# Patient Record
Sex: FEMALE | Race: WHITE | NOT HISPANIC OR LATINO | Employment: UNEMPLOYED | URBAN - METROPOLITAN AREA
[De-identification: names, ages, dates, MRNs, and addresses within clinical notes are randomized per-mention and may not be internally consistent; named-entity substitution may affect disease eponyms.]

---

## 2023-11-07 ENCOUNTER — TRANSFERRED RECORDS (OUTPATIENT)
Dept: HEALTH INFORMATION MANAGEMENT | Facility: CLINIC | Age: 53
End: 2023-11-07

## 2024-02-01 ENCOUNTER — HOSPITAL ENCOUNTER (INPATIENT)
Facility: CLINIC | Age: 54
LOS: 3 days | Discharge: HOME | DRG: 282 | End: 2024-02-04
Attending: INTERNAL MEDICINE | Admitting: HOSPITALIST
Payer: COMMERCIAL

## 2024-02-01 DIAGNOSIS — I21.4 NSTEMI (NON-ST ELEVATED MYOCARDIAL INFARCTION) (H): Primary | ICD-10-CM

## 2024-02-01 LAB
CREAT SERPL-MCNC: 0.76 MG/DL (ref 0.51–0.95)
EGFRCR SERPLBLD CKD-EPI 2021: >90 ML/MIN/1.73M2
ERYTHROCYTE [DISTWIDTH] IN BLOOD BY AUTOMATED COUNT: 15.8 % (ref 10–15)
HCT VFR BLD AUTO: 38.4 % (ref 35–47)
HGB BLD-MCNC: 12.5 G/DL (ref 11.7–15.7)
MAGNESIUM SERPL-MCNC: 1.7 MG/DL (ref 1.7–2.3)
MCH RBC QN AUTO: 29.1 PG (ref 26.5–33)
MCHC RBC AUTO-ENTMCNC: 32.6 G/DL (ref 31.5–36.5)
MCV RBC AUTO: 89 FL (ref 78–100)
PLATELET # BLD AUTO: 192 10E3/UL (ref 150–450)
POTASSIUM SERPL-SCNC: 3.6 MMOL/L (ref 3.4–5.3)
RBC # BLD AUTO: 4.3 10E6/UL (ref 3.8–5.2)
TROPONIN T SERPL HS-MCNC: 415 NG/L
UFH PPP CHRO-ACNC: 0.25 IU/ML
WBC # BLD AUTO: 6.3 10E3/UL (ref 4–11)

## 2024-02-01 PROCEDURE — 84484 ASSAY OF TROPONIN QUANT: CPT | Performed by: PHYSICIAN ASSISTANT

## 2024-02-01 PROCEDURE — 250N000013 HC RX MED GY IP 250 OP 250 PS 637: Performed by: PHYSICIAN ASSISTANT

## 2024-02-01 PROCEDURE — 84132 ASSAY OF SERUM POTASSIUM: CPT | Performed by: INTERNAL MEDICINE

## 2024-02-01 PROCEDURE — 36415 COLL VENOUS BLD VENIPUNCTURE: CPT | Performed by: PHYSICIAN ASSISTANT

## 2024-02-01 PROCEDURE — 85520 HEPARIN ASSAY: CPT | Performed by: INTERNAL MEDICINE

## 2024-02-01 PROCEDURE — 99222 1ST HOSP IP/OBS MODERATE 55: CPT | Mod: AI | Performed by: PHYSICIAN ASSISTANT

## 2024-02-01 PROCEDURE — 210N000002 HC R&B HEART CARE

## 2024-02-01 PROCEDURE — 82565 ASSAY OF CREATININE: CPT | Performed by: INTERNAL MEDICINE

## 2024-02-01 PROCEDURE — 85027 COMPLETE CBC AUTOMATED: CPT | Performed by: PHYSICIAN ASSISTANT

## 2024-02-01 PROCEDURE — 250N000011 HC RX IP 250 OP 636: Performed by: PHYSICIAN ASSISTANT

## 2024-02-01 PROCEDURE — 83735 ASSAY OF MAGNESIUM: CPT | Performed by: INTERNAL MEDICINE

## 2024-02-01 RX ORDER — HYDRALAZINE HYDROCHLORIDE 20 MG/ML
10 INJECTION INTRAMUSCULAR; INTRAVENOUS EVERY 4 HOURS PRN
Status: DISCONTINUED | OUTPATIENT
Start: 2024-02-01 | End: 2024-02-04 | Stop reason: HOSPADM

## 2024-02-01 RX ORDER — CALCIUM CARBONATE 500 MG/1
1000 TABLET, CHEWABLE ORAL 4 TIMES DAILY PRN
Status: DISCONTINUED | OUTPATIENT
Start: 2024-02-01 | End: 2024-02-04 | Stop reason: HOSPADM

## 2024-02-01 RX ORDER — HYDRALAZINE HYDROCHLORIDE 10 MG/1
10 TABLET, FILM COATED ORAL EVERY 4 HOURS PRN
Status: DISCONTINUED | OUTPATIENT
Start: 2024-02-01 | End: 2024-02-04 | Stop reason: HOSPADM

## 2024-02-01 RX ORDER — AMOXICILLIN 250 MG
2 CAPSULE ORAL 2 TIMES DAILY PRN
Status: DISCONTINUED | OUTPATIENT
Start: 2024-02-01 | End: 2024-02-04 | Stop reason: HOSPADM

## 2024-02-01 RX ORDER — LIDOCAINE 40 MG/G
CREAM TOPICAL
Status: DISCONTINUED | OUTPATIENT
Start: 2024-02-01 | End: 2024-02-04 | Stop reason: HOSPADM

## 2024-02-01 RX ORDER — METOPROLOL TARTRATE 25 MG/1
25 TABLET, FILM COATED ORAL 2 TIMES DAILY
Status: DISCONTINUED | OUTPATIENT
Start: 2024-02-01 | End: 2024-02-03

## 2024-02-01 RX ORDER — AMOXICILLIN 250 MG
1 CAPSULE ORAL 2 TIMES DAILY PRN
Status: DISCONTINUED | OUTPATIENT
Start: 2024-02-01 | End: 2024-02-04 | Stop reason: HOSPADM

## 2024-02-01 RX ORDER — ASPIRIN 81 MG/1
81 TABLET ORAL DAILY
Status: DISCONTINUED | OUTPATIENT
Start: 2024-02-02 | End: 2024-02-04 | Stop reason: HOSPADM

## 2024-02-01 RX ORDER — ACETAMINOPHEN 325 MG/1
650 TABLET ORAL EVERY 4 HOURS PRN
Status: DISCONTINUED | OUTPATIENT
Start: 2024-02-01 | End: 2024-02-04 | Stop reason: HOSPADM

## 2024-02-01 RX ORDER — HEPARIN SODIUM 10000 [USP'U]/100ML
0-5000 INJECTION, SOLUTION INTRAVENOUS CONTINUOUS
Status: DISCONTINUED | OUTPATIENT
Start: 2024-02-01 | End: 2024-02-02

## 2024-02-01 RX ORDER — NITROGLYCERIN 0.4 MG/1
0.4 TABLET SUBLINGUAL EVERY 5 MIN PRN
Status: DISCONTINUED | OUTPATIENT
Start: 2024-02-01 | End: 2024-02-04 | Stop reason: HOSPADM

## 2024-02-01 RX ORDER — ATORVASTATIN CALCIUM 80 MG/1
80 TABLET, FILM COATED ORAL EVERY EVENING
Status: DISCONTINUED | OUTPATIENT
Start: 2024-02-01 | End: 2024-02-02

## 2024-02-01 RX ORDER — ACETAMINOPHEN 650 MG/1
650 SUPPOSITORY RECTAL EVERY 4 HOURS PRN
Status: DISCONTINUED | OUTPATIENT
Start: 2024-02-01 | End: 2024-02-04 | Stop reason: HOSPADM

## 2024-02-01 RX ADMIN — HEPARIN SODIUM 1000 UNITS/HR: 10000 INJECTION, SOLUTION INTRAVENOUS at 21:43

## 2024-02-01 RX ADMIN — METOPROLOL TARTRATE 25 MG: 25 TABLET, FILM COATED ORAL at 21:39

## 2024-02-01 RX ADMIN — ATORVASTATIN CALCIUM 80 MG: 80 TABLET, FILM COATED ORAL at 21:38

## 2024-02-01 ASSESSMENT — ACTIVITIES OF DAILY LIVING (ADL)
ADLS_ACUITY_SCORE: 18
ADLS_ACUITY_SCORE: 18

## 2024-02-02 ENCOUNTER — APPOINTMENT (OUTPATIENT)
Dept: CARDIOLOGY | Facility: CLINIC | Age: 54
DRG: 282 | End: 2024-02-02
Attending: PHYSICIAN ASSISTANT
Payer: COMMERCIAL

## 2024-02-02 LAB
ALBUMIN SERPL BCG-MCNC: 3.8 G/DL (ref 3.5–5.2)
ALP SERPL-CCNC: 41 U/L (ref 40–150)
ALT SERPL W P-5'-P-CCNC: 13 U/L (ref 0–50)
ANION GAP SERPL CALCULATED.3IONS-SCNC: 7 MMOL/L (ref 7–15)
AST SERPL W P-5'-P-CCNC: 27 U/L (ref 0–45)
BASOPHILS # BLD AUTO: 0 10E3/UL (ref 0–0.2)
BASOPHILS NFR BLD AUTO: 1 %
BILIRUB SERPL-MCNC: 0.4 MG/DL
BUN SERPL-MCNC: 14.2 MG/DL (ref 6–20)
CALCIUM SERPL-MCNC: 9.3 MG/DL (ref 8.6–10)
CHLORIDE SERPL-SCNC: 105 MMOL/L (ref 98–107)
CHOLEST SERPL-MCNC: 163 MG/DL
CHOLEST SERPL-MCNC: 168 MG/DL
CREAT SERPL-MCNC: 0.73 MG/DL (ref 0.51–0.95)
D DIMER PPP FEU-MCNC: 0.27 UG/ML FEU (ref 0–0.5)
DEPRECATED HCO3 PLAS-SCNC: 26 MMOL/L (ref 22–29)
EGFRCR SERPLBLD CKD-EPI 2021: >90 ML/MIN/1.73M2
EOSINOPHIL # BLD AUTO: 0.3 10E3/UL (ref 0–0.7)
EOSINOPHIL NFR BLD AUTO: 5 %
ERYTHROCYTE [DISTWIDTH] IN BLOOD BY AUTOMATED COUNT: 15.8 % (ref 10–15)
GLUCOSE SERPL-MCNC: 91 MG/DL (ref 70–99)
HBA1C MFR BLD: 5.3 %
HCG INTACT+B SERPL-ACNC: 1 MIU/ML
HCT VFR BLD AUTO: 40.3 % (ref 35–47)
HDLC SERPL-MCNC: 71 MG/DL
HDLC SERPL-MCNC: 72 MG/DL
HGB BLD-MCNC: 13.1 G/DL (ref 11.7–15.7)
IMM GRANULOCYTES # BLD: 0 10E3/UL
IMM GRANULOCYTES NFR BLD: 0 %
INR PPP: 1.06 (ref 0.85–1.15)
LDLC SERPL CALC-MCNC: 78 MG/DL
LDLC SERPL CALC-MCNC: 81 MG/DL
LVEF ECHO: NORMAL
LYMPHOCYTES # BLD AUTO: 1.5 10E3/UL (ref 0.8–5.3)
LYMPHOCYTES NFR BLD AUTO: 26 %
MAGNESIUM SERPL-MCNC: 1.8 MG/DL (ref 1.7–2.3)
MCH RBC QN AUTO: 28.9 PG (ref 26.5–33)
MCHC RBC AUTO-ENTMCNC: 32.5 G/DL (ref 31.5–36.5)
MCV RBC AUTO: 89 FL (ref 78–100)
MONOCYTES # BLD AUTO: 0.5 10E3/UL (ref 0–1.3)
MONOCYTES NFR BLD AUTO: 10 %
NEUTROPHILS # BLD AUTO: 3.3 10E3/UL (ref 1.6–8.3)
NEUTROPHILS NFR BLD AUTO: 58 %
NONHDLC SERPL-MCNC: 91 MG/DL
NONHDLC SERPL-MCNC: 97 MG/DL
NRBC # BLD AUTO: 0 10E3/UL
NRBC BLD AUTO-RTO: 0 /100
PLATELET # BLD AUTO: 192 10E3/UL (ref 150–450)
POTASSIUM SERPL-SCNC: 4.2 MMOL/L (ref 3.4–5.3)
PROT SERPL-MCNC: 6.1 G/DL (ref 6.4–8.3)
RBC # BLD AUTO: 4.53 10E6/UL (ref 3.8–5.2)
SODIUM SERPL-SCNC: 138 MMOL/L (ref 135–145)
TRIGL SERPL-MCNC: 65 MG/DL
TRIGL SERPL-MCNC: 79 MG/DL
TROPONIN T SERPL HS-MCNC: 326 NG/L
UFH PPP CHRO-ACNC: 0.21 IU/ML
UFH PPP CHRO-ACNC: 0.43 IU/ML
WBC # BLD AUTO: 5.7 10E3/UL (ref 4–11)

## 2024-02-02 PROCEDURE — 250N000011 HC RX IP 250 OP 636: Performed by: PHYSICIAN ASSISTANT

## 2024-02-02 PROCEDURE — 99223 1ST HOSP IP/OBS HIGH 75: CPT | Mod: 25 | Performed by: INTERNAL MEDICINE

## 2024-02-02 PROCEDURE — 80061 LIPID PANEL: CPT | Performed by: PHYSICIAN ASSISTANT

## 2024-02-02 PROCEDURE — 4A023N7 MEASUREMENT OF CARDIAC SAMPLING AND PRESSURE, LEFT HEART, PERCUTANEOUS APPROACH: ICD-10-PCS | Performed by: INTERNAL MEDICINE

## 2024-02-02 PROCEDURE — 250N000013 HC RX MED GY IP 250 OP 250 PS 637: Performed by: PHYSICIAN ASSISTANT

## 2024-02-02 PROCEDURE — 93306 TTE W/DOPPLER COMPLETE: CPT | Mod: 26 | Performed by: INTERNAL MEDICINE

## 2024-02-02 PROCEDURE — 93454 CORONARY ARTERY ANGIO S&I: CPT | Performed by: INTERNAL MEDICINE

## 2024-02-02 PROCEDURE — 36415 COLL VENOUS BLD VENIPUNCTURE: CPT | Performed by: INTERNAL MEDICINE

## 2024-02-02 PROCEDURE — 250N000011 HC RX IP 250 OP 636: Performed by: INTERNAL MEDICINE

## 2024-02-02 PROCEDURE — B2111ZZ FLUOROSCOPY OF MULTIPLE CORONARY ARTERIES USING LOW OSMOLAR CONTRAST: ICD-10-PCS | Performed by: INTERNAL MEDICINE

## 2024-02-02 PROCEDURE — 250N000013 HC RX MED GY IP 250 OP 250 PS 637: Performed by: INTERNAL MEDICINE

## 2024-02-02 PROCEDURE — 93005 ELECTROCARDIOGRAM TRACING: CPT

## 2024-02-02 PROCEDURE — C1769 GUIDE WIRE: HCPCS | Performed by: INTERNAL MEDICINE

## 2024-02-02 PROCEDURE — 99232 SBSQ HOSP IP/OBS MODERATE 35: CPT | Performed by: HOSPITALIST

## 2024-02-02 PROCEDURE — 83036 HEMOGLOBIN GLYCOSYLATED A1C: CPT | Performed by: PHYSICIAN ASSISTANT

## 2024-02-02 PROCEDURE — 258N000003 HC RX IP 258 OP 636: Performed by: HOSPITALIST

## 2024-02-02 PROCEDURE — 255N000002 HC RX 255 OP 636: Performed by: INTERNAL MEDICINE

## 2024-02-02 PROCEDURE — C8929 TTE W OR WO FOL WCON,DOPPLER: HCPCS

## 2024-02-02 PROCEDURE — 999N000208 ECHOCARDIOGRAM COMPLETE

## 2024-02-02 PROCEDURE — 99153 MOD SED SAME PHYS/QHP EA: CPT | Performed by: INTERNAL MEDICINE

## 2024-02-02 PROCEDURE — 85520 HEPARIN ASSAY: CPT | Performed by: INTERNAL MEDICINE

## 2024-02-02 PROCEDURE — 258N000003 HC RX IP 258 OP 636: Performed by: INTERNAL MEDICINE

## 2024-02-02 PROCEDURE — 83735 ASSAY OF MAGNESIUM: CPT | Performed by: INTERNAL MEDICINE

## 2024-02-02 PROCEDURE — C1894 INTRO/SHEATH, NON-LASER: HCPCS | Performed by: INTERNAL MEDICINE

## 2024-02-02 PROCEDURE — 84484 ASSAY OF TROPONIN QUANT: CPT | Performed by: PHYSICIAN ASSISTANT

## 2024-02-02 PROCEDURE — 36415 COLL VENOUS BLD VENIPUNCTURE: CPT | Performed by: HOSPITALIST

## 2024-02-02 PROCEDURE — 85379 FIBRIN DEGRADATION QUANT: CPT | Performed by: HOSPITALIST

## 2024-02-02 PROCEDURE — 84702 CHORIONIC GONADOTROPIN TEST: CPT | Performed by: INTERNAL MEDICINE

## 2024-02-02 PROCEDURE — 272N000001 HC OR GENERAL SUPPLY STERILE: Performed by: INTERNAL MEDICINE

## 2024-02-02 PROCEDURE — 36415 COLL VENOUS BLD VENIPUNCTURE: CPT | Performed by: PHYSICIAN ASSISTANT

## 2024-02-02 PROCEDURE — 99152 MOD SED SAME PHYS/QHP 5/>YRS: CPT | Performed by: INTERNAL MEDICINE

## 2024-02-02 PROCEDURE — 93454 CORONARY ARTERY ANGIO S&I: CPT | Mod: 26 | Performed by: INTERNAL MEDICINE

## 2024-02-02 PROCEDURE — 250N000009 HC RX 250: Performed by: INTERNAL MEDICINE

## 2024-02-02 PROCEDURE — 85610 PROTHROMBIN TIME: CPT | Performed by: INTERNAL MEDICINE

## 2024-02-02 PROCEDURE — 210N000002 HC R&B HEART CARE

## 2024-02-02 PROCEDURE — 80053 COMPREHEN METABOLIC PANEL: CPT | Performed by: PHYSICIAN ASSISTANT

## 2024-02-02 PROCEDURE — 85025 COMPLETE CBC W/AUTO DIFF WBC: CPT | Performed by: PHYSICIAN ASSISTANT

## 2024-02-02 RX ORDER — ASPIRIN 81 MG/1
243 TABLET, CHEWABLE ORAL ONCE
Status: COMPLETED | OUTPATIENT
Start: 2024-02-02 | End: 2024-02-02

## 2024-02-02 RX ORDER — FENTANYL CITRATE 50 UG/ML
INJECTION, SOLUTION INTRAMUSCULAR; INTRAVENOUS
Status: DISCONTINUED | OUTPATIENT
Start: 2024-02-02 | End: 2024-02-02 | Stop reason: HOSPADM

## 2024-02-02 RX ORDER — ACETAMINOPHEN 325 MG/1
650 TABLET ORAL EVERY 4 HOURS PRN
Status: DISCONTINUED | OUTPATIENT
Start: 2024-02-02 | End: 2024-02-02

## 2024-02-02 RX ORDER — FLUMAZENIL 0.1 MG/ML
0.2 INJECTION, SOLUTION INTRAVENOUS
Status: ACTIVE | OUTPATIENT
Start: 2024-02-02 | End: 2024-02-02

## 2024-02-02 RX ORDER — SODIUM CHLORIDE 9 MG/ML
INJECTION, SOLUTION INTRAVENOUS CONTINUOUS
Status: DISCONTINUED | OUTPATIENT
Start: 2024-02-02 | End: 2024-02-02 | Stop reason: HOSPADM

## 2024-02-02 RX ORDER — NALOXONE HYDROCHLORIDE 0.4 MG/ML
0.4 INJECTION, SOLUTION INTRAMUSCULAR; INTRAVENOUS; SUBCUTANEOUS
Status: DISCONTINUED | OUTPATIENT
Start: 2024-02-02 | End: 2024-02-04 | Stop reason: HOSPADM

## 2024-02-02 RX ORDER — FENTANYL CITRATE 50 UG/ML
25 INJECTION, SOLUTION INTRAMUSCULAR; INTRAVENOUS
Status: DISCONTINUED | OUTPATIENT
Start: 2024-02-02 | End: 2024-02-04 | Stop reason: HOSPADM

## 2024-02-02 RX ORDER — MAGNESIUM OXIDE 400 MG/1
400 TABLET ORAL EVERY 4 HOURS
Status: COMPLETED | OUTPATIENT
Start: 2024-02-02 | End: 2024-02-02

## 2024-02-02 RX ORDER — IOPAMIDOL 755 MG/ML
INJECTION, SOLUTION INTRAVASCULAR
Status: DISCONTINUED | OUTPATIENT
Start: 2024-02-02 | End: 2024-02-02 | Stop reason: HOSPADM

## 2024-02-02 RX ORDER — SODIUM CHLORIDE 9 MG/ML
INJECTION, SOLUTION INTRAVENOUS CONTINUOUS
Status: DISCONTINUED | OUTPATIENT
Start: 2024-02-02 | End: 2024-02-03

## 2024-02-02 RX ORDER — POTASSIUM CHLORIDE 1500 MG/1
20 TABLET, EXTENDED RELEASE ORAL
Status: DISCONTINUED | OUTPATIENT
Start: 2024-02-02 | End: 2024-02-02 | Stop reason: HOSPADM

## 2024-02-02 RX ORDER — SODIUM CHLORIDE 9 MG/ML
75 INJECTION, SOLUTION INTRAVENOUS CONTINUOUS
Status: ACTIVE | OUTPATIENT
Start: 2024-02-02 | End: 2024-02-02

## 2024-02-02 RX ORDER — LIDOCAINE 40 MG/G
CREAM TOPICAL
Status: DISCONTINUED | OUTPATIENT
Start: 2024-02-02 | End: 2024-02-02 | Stop reason: HOSPADM

## 2024-02-02 RX ORDER — ATROPINE SULFATE 0.1 MG/ML
0.5 INJECTION INTRAVENOUS
Status: ACTIVE | OUTPATIENT
Start: 2024-02-02 | End: 2024-02-02

## 2024-02-02 RX ORDER — LORAZEPAM 2 MG/ML
0.5 INJECTION INTRAMUSCULAR
Status: DISCONTINUED | OUTPATIENT
Start: 2024-02-02 | End: 2024-02-02 | Stop reason: HOSPADM

## 2024-02-02 RX ORDER — LORAZEPAM 0.5 MG/1
0.5 TABLET ORAL
Status: DISCONTINUED | OUTPATIENT
Start: 2024-02-02 | End: 2024-02-02 | Stop reason: HOSPADM

## 2024-02-02 RX ORDER — NALOXONE HYDROCHLORIDE 0.4 MG/ML
0.2 INJECTION, SOLUTION INTRAMUSCULAR; INTRAVENOUS; SUBCUTANEOUS
Status: DISCONTINUED | OUTPATIENT
Start: 2024-02-02 | End: 2024-02-04 | Stop reason: HOSPADM

## 2024-02-02 RX ORDER — HEPARIN SODIUM 1000 [USP'U]/ML
INJECTION, SOLUTION INTRAVENOUS; SUBCUTANEOUS
Status: DISCONTINUED | OUTPATIENT
Start: 2024-02-02 | End: 2024-02-02 | Stop reason: HOSPADM

## 2024-02-02 RX ORDER — ASPIRIN 325 MG
325 TABLET ORAL ONCE
Status: COMPLETED | OUTPATIENT
Start: 2024-02-02 | End: 2024-02-02

## 2024-02-02 RX ORDER — POTASSIUM CHLORIDE 1500 MG/1
20 TABLET, EXTENDED RELEASE ORAL ONCE
Status: COMPLETED | OUTPATIENT
Start: 2024-02-02 | End: 2024-02-02

## 2024-02-02 RX ORDER — NITROGLYCERIN 5 MG/ML
VIAL (ML) INTRAVENOUS
Status: DISCONTINUED | OUTPATIENT
Start: 2024-02-02 | End: 2024-02-02 | Stop reason: HOSPADM

## 2024-02-02 RX ORDER — VERAPAMIL HYDROCHLORIDE 2.5 MG/ML
INJECTION, SOLUTION INTRAVENOUS
Status: DISCONTINUED | OUTPATIENT
Start: 2024-02-02 | End: 2024-02-02 | Stop reason: HOSPADM

## 2024-02-02 RX ORDER — ONDANSETRON 2 MG/ML
INJECTION INTRAMUSCULAR; INTRAVENOUS
Status: DISCONTINUED | OUTPATIENT
Start: 2024-02-02 | End: 2024-02-02 | Stop reason: HOSPADM

## 2024-02-02 RX ADMIN — Medication 400 MG: at 05:02

## 2024-02-02 RX ADMIN — ASPIRIN 81 MG: 81 TABLET, COATED ORAL at 09:25

## 2024-02-02 RX ADMIN — SODIUM CHLORIDE: 9 INJECTION, SOLUTION INTRAVENOUS at 19:03

## 2024-02-02 RX ADMIN — SODIUM CHLORIDE 150 ML/HR: 9 INJECTION, SOLUTION INTRAVENOUS at 10:54

## 2024-02-02 RX ADMIN — ACETAMINOPHEN 650 MG: 325 TABLET, FILM COATED ORAL at 06:28

## 2024-02-02 RX ADMIN — Medication 400 MG: at 00:10

## 2024-02-02 RX ADMIN — METOPROLOL TARTRATE 25 MG: 25 TABLET, FILM COATED ORAL at 09:25

## 2024-02-02 RX ADMIN — POTASSIUM CHLORIDE 20 MEQ: 1500 TABLET, EXTENDED RELEASE ORAL at 00:10

## 2024-02-02 RX ADMIN — HEPARIN SODIUM 1150 UNITS/HR: 10000 INJECTION, SOLUTION INTRAVENOUS at 06:26

## 2024-02-02 RX ADMIN — HUMAN ALBUMIN MICROSPHERES AND PERFLUTREN 9 ML: 10; .22 INJECTION, SOLUTION INTRAVENOUS at 10:19

## 2024-02-02 RX ADMIN — ASPIRIN 81 MG CHEWABLE TABLET 243 MG: 81 TABLET CHEWABLE at 10:54

## 2024-02-02 ASSESSMENT — ACTIVITIES OF DAILY LIVING (ADL)
ADLS_ACUITY_SCORE: 18

## 2024-02-02 NOTE — PROGRESS NOTES
"[facility]    Medicine Progress Note - Hospitalist Service    Date of Admission:  2/1/2024    Assessment & Plan   Chest pain with troponin elevation  P/w chest pain, dizziness, nausea, emesis  to an outside hospital with reported negative EKG found to have troponin elevation consistent with NSTEMI and was transferred to this facility.  She has remained cp free  Echocardiogram with some aortic root dilation but no wall motion abnormalities  Cardiology consulted and underwent an angiogram on 2/2 without significant coronary artery disease except for a segment of intramyocardial bridging with mild systolic compression in the midLAD (this would not be expected to cause angina or NSTEMI).   plan  - ASA 81 mg daily  - no indication for continuation of statin  - we will obtain a d dimer, and if positive a CT PE study, but her pain was not pleuritic and she does not endorse symptoms of DVT  - remain on telemetry  - continue monitoring tonight very closely      Neck pain, chronic and likely related to musculoskeletal issues  She also reports occ nocturnal hand numbness that resolves upon waking bilaterally, and she may benefit from cervical imaging, discussed with her to discuss with her PCP        Diet: Advance Diet as Tolerated: Clear Liquid Diet    DVT Prophylaxis: Pneumatic Compression Devices  Covarrubias Catheter: Not present  Lines: PRESENT             Cardiac Monitoring: ACTIVE order. Indication: Post- PCI/Angiogram (24 hours)  Code Status: Full Code      Clinically Significant Risk Factors Present on Admission                       # Obesity: Estimated body mass index is 32.27 kg/m  as calculated from the following:    Height as of this encounter: 1.651 m (5' 5\").    Weight as of this encounter: 88 kg (193 lb 14.4 oz).              Disposition Plan      Expected Discharge Date: 02/03/2024                    Dinh Naylor, DO  Hospitalist Service  [facility] " Hospital  Securely message with e27 (more info)  Text page via [facility] Paging/Directory   ______________________________________________________________________    Interval History   No major chest pain  Endorses signfiicant pain in the neck for many years, worse with mobility of the neck. Some numbness and tingling in the bilateral hands    Physical Exam   Vital Signs: Temp: 98  F (36.7  C) Temp src: Oral BP: (!) 147/84 Pulse: 53   Resp: 16 SpO2: 96 % O2 Device: None (Room air)    Weight: 193 lbs 14.4 oz    Constitutional: Awake, alert, cooperative, no apparent distress.    ENT: Normocephalic, without obvious abnormality, atraumatic. Eye pupils are equal. Normal sclera.    Neck: Supple, symmetrical, trachea midline  Pulmonary: No increased work of breathing, good air exchange, clear to auscultation bilaterally  Cardiovascular: Regular rate and rhythm, normal S1 and S2, no S3 or S4, and no murmur noted.  GI: Normal bowel sounds, soft, non-distended, non-tender.   Skin: Visualized skin appeared clear.  Neuro: Oriented x 3. Moves all extremities spontaneously. No focal neuro deficits.  Psych:  Normal affect and mood  Extremities: Normal muscle tone. No gross deformities noted. Calves non-tender b/l. No pitting edema b/l LE    Medical Decision Making       60 MINUTES SPENT BY ME on the date of service doing chart review, history, exam, documentation & further activities per the note.      Data     I have personally reviewed the following data over the past 24 hrs:    5.7  \   13.1   / 192     138 105 14.2 /  91   4.2 26 0.73 \     ALT: 13 AST: 27 AP: 41 TBILI: 0.4   ALB: 3.8 TOT PROTEIN: 6.1 (L) LIPASE: N/A     Trop: 326 (HH) BNP: N/A     TSH: N/A T4: N/A A1C: 5.3     INR:  1.06 PTT:  N/A   D-dimer:  N/A Fibrinogen:  N/A       Imaging results reviewed over the past 24 hrs:   Recent Results (from the past 24 hour(s))   XR CHEST 2 VIEWS    Narrative    PROCEDURE: XR CHEST 2 VIEWS.    HISTORY: chest pain    COMPARISON:  2017.    FINDINGS:  No focal pulmonary opacity, pneumothorax, or pleural effusion. Normal cardiac  and mediastinal contours.     IMPRESSION:  No acute cardiopulmonary findings.     Electronically signed by Mane Denney MD   Report Date: 2024 7:36 AM   Echocardiogram Complete   Result Value    LVEF  55-60%    Narrative    [id number]  [id number]  [id number]  [id number]^ALBERT^HALIMA^MEMO     [facility]  Echocardiography Laboratory  [address] [id number]     Name: SARA CABARL  MRN: [id number]  : 1970  Study Date: 2024 09:54 AM  Age: 53 yrs  Gender: Female  Patient Location: [facility] [address]  Reason For Study: Chest Pain  Ordering Physician: HALIMA PRICE  Referring Physician: SYSTEM, PROVIDER NOT IN  Performed By: Melania Spicer     BSA: 2.0 m2  Height: 65 in  Weight: 196 lb  HR: 64  BP: 154/84 mmHg  ______________________________________________________________________________  Procedure  Complete Echo Adult. Optison (NDC #[id number]) given intravenously.  ______________________________________________________________________________  Interpretation Summary     The visual ejection fraction is 55-60%.  No regional wall motion abnormalities noted.  Normal tricuspid aortic valve  There is trace aortic regurgitation.  Aortic root dilatation is present.  Ascending aorta dilatation is present.  ______________________________________________________________________________  Left Ventricle  The left ventricle is normal in size. There is normal left ventricular wall  thickness. Diastolic Doppler findings (E/E' ratio and/or other parameters)  suggest left ventricular filling pressures are indeterminate. The visual  ejection fraction is 55-60%. No regional wall motion abnormalities noted.     Right Ventricle  The right ventricle is normal in size and function.     Atria  Normal left atrial size. Right atrial size is normal. There is no color  Doppler evidence of an atrial  shunt.     Mitral Valve  The mitral valve leaflets are mildly thickened. There is trace mitral  regurgitation.     Tricuspid Valve  There is trace tricuspid regurgitation. IVC diameter and respiratory changes  fall into an intermediate range suggesting an RA pressure of 8 mmHg.     Aortic Valve  Normal tricuspid aortic valve. There is trace aortic regurgitation.     Pulmonic Valve  There is trace pulmonic valvular regurgitation.     Vessels  Aortic root dilatation is present. Ascending aorta dilatation is present.     Pericardium  There is no pericardial effusion.     Rhythm  Sinus rhythm was noted.  ______________________________________________________________________________  MMode/2D Measurements & Calculations  IVSd: 0.99 cm     LVIDd: 3.9 cm  LVPWd: 0.98 cm  LV mass(C)d: 117.2 grams  LV mass(C)dI: 59.8 grams/m2  Ao root diam: 3.8 cm  LA dimension: 2.2 cm  asc Aorta Diam: 4.0 cm  LA/Ao: 0.58  LVOT diam: 2.1 cm  LVOT area: 3.4 cm2  Ao root diam index Ht(cm/m): 2.3  Ao root diam index BSA (cm/m2): 1.9  Asc Ao diam index BSA (cm/m2): 2.1  Asc Ao diam index Ht(cm/m): 2.4  LA Volume (BP): 27.9 ml     LA Volume Index (BP): 14.2 ml/m2  RV Base: 2.7 cm  RWT: 0.51  TAPSE: 2.4 cm     Doppler Measurements & Calculations  MV E max ja: 62.6 cm/sec  MV A max ja: 102.4 cm/sec  MV E/A: 0.61  MV dec slope: 269.2 cm/sec2  MV dec time: 0.23 sec  Ao V2 max: 139.5 cm/sec  Ao max P.0 mmHg  Ao V2 mean: 92.1 cm/sec  Ao mean P.0 mmHg  Ao V2 VTI: 30.0 cm  TRISHA(I,D): 2.4 cm2  TRISHA(V,D): 2.1 cm2  LV V1 max PG: 3.0 mmHg  LV V1 max: 86.5 cm/sec  LV V1 VTI: 21.4 cm  SV(LVOT): 72.8 ml  SI(LVOT): 37.1 ml/m2  PA acc time: 0.11 sec  AV Ja Ratio (DI): 0.62  TRISHA Index (cm2/m2): 1.2  E/E' av.4  Lateral E/e': 6.1     Medial E/e': 8.6  RV S Ja: 11.6 cm/sec     ______________________________________________________________________________  Report approved by: Dangelo Young 2024 01:24 PM         Cardiac Catheterization     Narrative    Coronary arteries appear angiographically normal except for a segment of   intramyocardial bridging with mild systolic compression in the midLAD   (this would not be expected to cause angina or NSTEMI).

## 2024-02-02 NOTE — PLAN OF CARE
Goal Outcome Evaluation:    VSS. Monitor remains Sinus rhythm. Pt. Is alert and oriented. Pt. Has been painfree. Heparin drip at 1000 unit(s)/hr. Continue to monitor. NPO after midnight. Plan for Cardiology consult and Echo tomorrow.

## 2024-02-02 NOTE — PROVIDER NOTIFICATION
Notification     Notified Person: Alexandrea GOMEZ    Notification Time: 2325     Notification Interaction: Volcerfelicia     Purpose of Notification:   admitted this evening w/ nstemi. Received critical troponin from lab 415, on heparin and pain free. Q4 troponins ordered.     Orders Received: Continue w/ poc

## 2024-02-02 NOTE — PLAN OF CARE
Goal Outcome Evaluation:      Plan of Care Reviewed With: patient      Patient is alert oriented independent in the room, denies chest pain, VSS, RA, on heparin drip at cath lab with for angiogram. Family is at bedside.

## 2024-02-02 NOTE — H&P
"[facility]  History and Physical - Hospitalist Service       Date of Admission:  2/1/2024  PRIMARY CARE PROVIDER:    No primary care provider on file.    Assessment & Plan   Lorena Tolbert is a 53 year old female with PMH of anxiety, iron deficiency anemia who presented to [facility] ED on 2/1/24 for evaluation of chest pain, dizziness, nausea, emesis x 1. She woke up with the chest pain, which radiated into her left arm and was described as a tightness. First episode lasted about 2 hours before resolving, and then she had another episode lasting about an hour or so prior to arriving to the ED. She has been chest pain free since. CBC and BMP unremarkable. High sensitivity troponin I was elevated at 4,034. Per report, CXR negative. Initial EKG NSR without acute ischemic changes, with repeat unchanged. She was given ASA and started on a Heparin drip for NSTEMI. Case was discussed with cardiology prior to transfer to [facility], no plan for emergent angiogram. Patient to be made NPO pending cardiology evaluation tomorrow.    Upon arrival to [facility], /84 and HR 82. She endorses no chest pain, dyspnea, nausea, vomiting. She has no cardiac history. Reports mother has hx CHF, but does not have any family with hx MI or CAD. She is a non smoker. No significant PMH (no HTN, HLD, DM II).    NSTEMI  - ASA 81 mg daily  - Continue Heparin drip   - Start Metoprolol Tartrate 25 mg BID  - Start Atorvastatin 80 mg HS  - Trend troponin to peak  - Check lipid panel tomorrow AM  - Check A1c tomorrow AM  - ECHO ordered  - Cardiology consult requested  - NPO after midnight    Hx iron deficiency anemia  *Hgb WNL at 13.9    Clinically Significant Risk Factors Present on Admission                       # Obesity: Estimated body mass index is 32.7 kg/m  as calculated from the following:    Height as of this encounter: 1.651 m (5' 5\").    Weight as of this encounter: 89.1 kg (196 lb 8 oz).               Diet: " Combination Diet Low Saturated Fat Na <2400mg Diet, No Caffeine Diet  NPO for Medical/Clinical Reasons Except for: Ice Chips, Meds    DVT Prophylaxis: Heparin gtt  Covarrubias Catheter: Not present  Lines: None     Cardiac Monitoring: ACTIVE order. Indication: AMI (NSTEMI/ STEMI) (48 hours)  Code Status:  Full         Disposition Plan      Expected Discharge Date: 02/03/2024             Entered: Meliza Neely PA-C 02/01/2024, 8:16 PM     The patient's care was discussed with the Attending Physician, Dr. Lechuga and Patient.    Meliza Neely PA-C  [facility]  Securely message with the Vocera Web Console (learn more here)      ______________________________________________________________________    Chief Complaint   Chest pain    History is obtained from the patient and EMR.      History of Present Illness   Lorena Tolbert is a 53 year old female with PMH of anxiety, iron deficiency anemia who presented to [facility] ED on 2/1/24 for evaluation of chest pain, dizziness, nausea, emesis x 1. She woke up with the chest pain, which radiated into her left arm and was described as a tightness. First episode lasted about 2 hours before resolving, and then she had another episode lasting about an hour or so prior to arriving to the ED. She has been chest pain free since. CBC and BMP unremarkable. High sensitivity troponin I was elevated at 4,034. Per report, CXR negative. Initial EKG NSR without acute ischemic changes, with repeat unchanged. She was given ASA and started on a Heparin drip for NSTEMI. Patient was transferred to [facility] for cardiology consultation.    Upon arrival to [facility], /84 and HR 82. She endorses no chest pain, dyspnea, nausea, vomiting. She has no cardiac history. Reports mother has hx CHF, but does not have any family with hx MI or CAD. She is a non smoker. No significant PMH (no HTN, HLD, DM II).    Past Medical History    I have reviewed this patient's medical  history and updated it with pertinent information if needed.   No past medical history on file.    Prior to Admission Medications   Prior to Admission Medications   Prescriptions Last Dose Informant Patient Reported? Taking?   Cyanocobalamin (VITAMIN B12 PO)   Yes Yes   Sig: Take 1 tablet by mouth daily   Ferrous Sulfate (IRON PO) 2/1/2024  Yes Yes   Sig: Take 1 tablet by mouth daily   VITAMIN D PO   Yes Yes   Sig: Take 1 tablet by mouth daily      Facility-Administered Medications: None     Allergies   No Known Allergies    Physical Exam   Vital Signs: Temp: 98.2  F (36.8  C) Temp src: Tympanic BP: (!) 154/84 Pulse: 82   Resp: 18 SpO2: 99 % O2 Device: None (Room air)    Weight: 196 lbs 8 oz    Constitutional: Awake, alert, cooperative, no apparent distress.    ENT: Normocephalic, without obvious abnormality, atraumatic. Eye pupils are equal. Normal sclera.    Neck: Supple, symmetrical, trachea midline  Pulmonary: No increased work of breathing, good air exchange, clear to auscultation bilaterally  Cardiovascular: Regular rate and rhythm, normal S1 and S2, no S3 or S4, and no murmur noted.  GI: Normal bowel sounds, soft, non-distended, non-tender.   Skin: Visualized skin appeared clear.  Neuro: Oriented x 3. Moves all extremities spontaneously. No focal neuro deficits.  Psych:  Normal affect and mood  Extremities: Normal muscle tone. No gross deformities noted. Calves non-tender b/l. No pitting edema b/l LE    Medical Decision Making       45 MINUTES SPENT BY ME on the date of service doing chart review, history, exam, documentation & further activities per the note.         Data   Data reviewed today: I reviewed all medications, new labs and imaging results over the last 24 hours. I personally reviewed no images or EKG's today.          Imaging results reviewed over the past 24 hrs:   No results found for this or any previous visit (from the past 24 hour(s)).

## 2024-02-02 NOTE — PHARMACY-ADMISSION MEDICATION HISTORY
Pharmacist Admission Medication History    Admission medication history is complete. The information provided in this note is only as accurate as the sources available at the time of the update.    Information Source(s): Patient and CareEverywhere/SureScripts via in-person    Pertinent Information:     Changes made to PTA medication list:  Added: all  Deleted: None  Changed: None    Medication Affordability:       Allergies reviewed with patient and updates made in EHR: no    Medication History Completed By: Akil Aliheyder, RPH 2/1/2024 8:09 PM    PTA Med List   Medication Sig Last Dose    Cyanocobalamin (VITAMIN B12 PO) Take 1 tablet by mouth daily     Ferrous Sulfate (IRON PO) Take 1 tablet by mouth daily 2/1/2024    VITAMIN D PO Take 1 tablet by mouth daily

## 2024-02-02 NOTE — PLAN OF CARE
"Goal Outcome Evaluation:  2515-0792  Neuro- x4  Most Recent Vitals- BP (!) 148/83 (BP Location: Left arm)   Pulse 71   Temp 97.7  F (36.5  C) (Oral)   Resp 18   Ht 1.651 m (5' 5\")   Wt 88 kg (193 lb 14.4 oz)   SpO2 96%   BMI 32.27 kg/m    Tele/Cardiac- SR  Resp- RA denies carr/sob  Activity- independent   Pain- denies   Drips- heparin 1150 units/hr   GI/- WDL except pt reported few episodes of diarrhea this morning   Labs K and MG replaced per protocol recheck w/ AM labs   Diet: NPO for Medical/Clinical Reasons Except for: Ice Chips, Meds    Plan- NPO, echo and cardiology consult today.      "

## 2024-02-02 NOTE — CONSULTS
[facility]    Cardiology Consultation     Date of Admission:  2/1/2024    Assessment & Plan   Lorena Tolbert is a 53 year old female who was admitted on 2/1/2024.    NSTEMI, high probability of ACS (Type I MI); Stress CM (Takotsubo) or myocarditis are possible but less likely  Obesity  Remote history of tobacco use (quit at age 30)    It was a pleasure to speak with Lorena at the bedside today.  We discussed the possible causes of her symptoms and elevated troponin values.  I do think that type I MI (acute coronary syndrome) is quite likely in her case.  Given the amount of stress that she has been under, stress cardiomyopathy is also a possibility, but probably less likely.  We talked about options for ischemic evaluation, including coronary CTA, noninvasive functional stress imaging, and invasive angiography.  Ultimately, I would recommend invasive angiography.  We discussed the risks, benefits, and alternatives to this, and she is in agreement with proceeding.  She has no obvious contraindications to angiography or DAPT if PCI is required.  We would want her to minimize her ibuprofen intake if she does require DAPT in the future (and this would be a good idea regardless).      -TTE ordered but not yet completed  -Coronary angiography and PCI as indicated  -Remain n.p.o. until procedure  -Continue low intensity heparin drip  -Continue aspirin 81 mg daily   -Continue high intensity statin  -Continue beta-blocker  -Further plans pending angiography results        High complexity     Stefan Obrien MD    Primary Care Physician   [facility]    Reason for Consult   Reason for consult: I was asked by Dr. Lechuga to evaluate this patient for NSTEMI.    History of Present Illness   Lorena Tolbert is a 53 year old female who presents with NSTEMI.  Yesterday AM (2/1/24) she woke up with chest pressure/tightness which was new for her.  Symptoms waxed and waned for an hour or so and  eventually worsened to the point that she developed nausea and vomited.  As a result she went to the [facility] stand-alone ER, though by that time her symptoms had resolved and have not recurred.  Initial ECG is not available for my review but was reportedly unremarkable.  High-sensitivity troponin I was 4,034.  Accordingly she was transferred to [facility] for further management.  Asymptomatic at present.  She has received aspirin load, high-intensity statin, beta blocker, and is on heparin gtt.  No contra-indications to angio/DAPT.  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  She also takes 6-9 tablets of ibuprofen per day when she has a headache, maybe 1-2 times per week.  No other recent events.    No FH of CAD.  Mother had CVA at age 50 and has heart failure later in life.  Patient has a remote smoking history but quit at age 30.      Past Medical History   Obesity  LISA      Prior to Admission Medications   Prior to Admission Medications   Prescriptions Last Dose Informant Patient Reported? Taking?   Cyanocobalamin (VITAMIN B12 PO)   Yes Yes   Sig: Take 1 tablet by mouth daily   Ferrous Sulfate (IRON PO) 2/1/2024  Yes Yes   Sig: Take 1 tablet by mouth daily   VITAMIN D PO   Yes Yes   Sig: Take 1 tablet by mouth daily      Facility-Administered Medications: None     Current Facility-Administered Medications   Medication Dose Route Frequency    aspirin  81 mg Oral Daily    atorvastatin  80 mg Oral QPM    metoprolol tartrate  25 mg Oral BID    sodium chloride (PF)  3 mL Intracatheter Q8H     Current Facility-Administered Medications   Medication Last Rate    heparin 1,150 Units/hr (02/02/24 8341)    - MEDICATION INSTRUCTIONS -       Allergies   No Known Allergies    Review of Systems   A comprehensive review of system was performed and is negative other than that noted in the HPI or here.  "    Physical Exam   Vital Signs with Ranges  Temp:  [97.7  F (36.5  C)-98.2  F (36.8  C)] 97.7  F (36.5  C)  Pulse:  [71-82] 73  Resp:  [18] 18  BP: (136-167)/() 136/83  SpO2:  [96 %-99 %] 97 %  Wt Readings from Last 4 Encounters:   02/02/24 88 kg (193 lb 14.4 oz)     I/O last 3 completed shifts:  In: 200 [P.O.:200]  Out: -       Vitals: /83   Pulse 73   Temp 97.7  F (36.5  C) (Oral)   Resp 18   Ht 1.651 m (5' 5\")   Wt 88 kg (193 lb 14.4 oz)   SpO2 97%   BMI 32.27 kg/m      Physical Exam:   General - Alert and oriented to time place and person in no acute distress  Eyes - No scleral icterus  HEENT - Neck supple, moist mucous membranes  Cardiovascular - RRR, no obvious m/r/g  Extremities - There is no LE edema  Respiratory - CTAB  Skin - No pallor or cyanosis  Gastrointestinal - Non tender and non distended without rebound or guarding  Psych - Appropriate affect   Neurological - No gross motor neurological focal deficits    No lab results found in last 7 days.    Invalid input(s): \"TROPONINIES\"    Recent Labs   Lab 02/02/24  0538 02/01/24 2228   WBC 5.7 6.3   HGB 13.1 12.5   MCV 89 89    192   INR 1.06  --      --    POTASSIUM 4.2 3.6   CHLORIDE 105  --    CO2 26  --    BUN 14.2  --    CR 0.73 0.76   GFRESTIMATED >90 >90   ANIONGAP 7  --    BETSY 9.3  --    GLC 91  --    ALBUMIN 3.8  --    PROTTOTAL 6.1*  --    BILITOTAL 0.4  --    ALKPHOS 41  --    ALT 13  --    AST 27  --      No results for input(s): \"CHOL\", \"HDL\", \"LDL\", \"TRIG\", \"CHOLHDLRATIO\" in the last 31108 hours.  Recent Labs   Lab 02/02/24  0538 02/01/24 2228   WBC 5.7 6.3   HGB 13.1 12.5   HCT 40.3 38.4   MCV 89 89    192     No results for input(s): \"PH\", \"PHV\", \"PO2\", \"PO2V\", \"SAT\", \"PCO2\", \"PCO2V\", \"HCO3\", \"HCO3V\" in the last 168 hours.  No results for input(s): \"NTBNPI\", \"NTBNP\" in the last 168 hours.  No results for input(s): \"DD\" in the last 168 hours.  No results for input(s): \"SED\", \"CRP\" in the last 168 " "hours.  Recent Labs   Lab 02/02/24  0538 02/01/24  2228    192     No results for input(s): \"TSH\" in the last 168 hours.  No results for input(s): \"COLOR\", \"APPEARANCE\", \"URINEGLC\", \"URINEBILI\", \"URINEKETONE\", \"SG\", \"UBLD\", \"URINEPH\", \"PROTEIN\", \"UROBILINOGEN\", \"NITRITE\", \"LEUKEST\", \"RBCU\", \"WBCU\" in the last 168 hours.    Imaging:  Recent Results (from the past 48 hour(s))   XR CHEST 2 VIEWS    Narrative    PROCEDURE: XR CHEST 2 VIEWS.    HISTORY: chest pain    COMPARISON: 11/17/2017.    FINDINGS:  No focal pulmonary opacity, pneumothorax, or pleural effusion. Normal cardiac  and mediastinal contours.     IMPRESSION:  No acute cardiopulmonary findings.     Electronically signed by Mane Denney MD   Report Date: 2/2/2024 7:36 AM       Echo:  No results found for this or any previous visit (from the past 4320 hour(s)).    Clinically Significant Risk Factors Present on Admission                       # Obesity: Estimated body mass index is 32.27 kg/m  as calculated from the following:    Height as of this encounter: 1.651 m (5' 5\").    Weight as of this encounter: 88 kg (193 lb 14.4 oz).                                                                  "

## 2024-02-02 NOTE — PRE-PROCEDURE
GENERAL PRE-PROCEDURE:   Procedure:  Coronary angiogram possibe PCI  Date/Time:  2/2/2024 2:51 PM    Written consent obtained?: Yes    Risks and benefits: Risks, benefits and alternatives were discussed    Consent given by:  Patient  Patient states understanding of procedure being performed: Yes    Patient's understanding of procedure matches consent: Yes    Procedure consent matches procedure scheduled: Yes    Expected level of sedation:  Moderate  Appropriately NPO:  Yes  ASA Class:  4  Mallampati  :  Grade 1- soft palate, uvula, tonsillar pillars, and posterior pharyngeal wall visible  Lungs:  Lungs clear with good breath sounds bilaterally  Heart:  Normal heart sounds and rate  History & Physical reviewed:  History and physical reviewed and no updates needed  Statement of review:  I have reviewed the lab findings, diagnostic data, medications, and the plan for sedation

## 2024-02-03 ENCOUNTER — APPOINTMENT (OUTPATIENT)
Dept: PHYSICAL THERAPY | Facility: CLINIC | Age: 54
DRG: 282 | End: 2024-02-03
Attending: HOSPITALIST
Payer: COMMERCIAL

## 2024-02-03 LAB
GLUCOSE BLDC GLUCOMTR-MCNC: 90 MG/DL (ref 70–99)
MAGNESIUM SERPL-MCNC: 1.6 MG/DL (ref 1.7–2.3)
POTASSIUM SERPL-SCNC: 4 MMOL/L (ref 3.4–5.3)
TROPONIN T SERPL HS-MCNC: 194 NG/L

## 2024-02-03 PROCEDURE — 36415 COLL VENOUS BLD VENIPUNCTURE: CPT | Performed by: INTERNAL MEDICINE

## 2024-02-03 PROCEDURE — 97161 PT EVAL LOW COMPLEX 20 MIN: CPT | Mod: GP

## 2024-02-03 PROCEDURE — 99232 SBSQ HOSP IP/OBS MODERATE 35: CPT | Performed by: INTERNAL MEDICINE

## 2024-02-03 PROCEDURE — 97530 THERAPEUTIC ACTIVITIES: CPT | Mod: GP

## 2024-02-03 PROCEDURE — 250N000013 HC RX MED GY IP 250 OP 250 PS 637: Performed by: PHYSICIAN ASSISTANT

## 2024-02-03 PROCEDURE — 84132 ASSAY OF SERUM POTASSIUM: CPT | Performed by: INTERNAL MEDICINE

## 2024-02-03 PROCEDURE — 99232 SBSQ HOSP IP/OBS MODERATE 35: CPT | Performed by: HOSPITALIST

## 2024-02-03 PROCEDURE — 97110 THERAPEUTIC EXERCISES: CPT | Mod: GP

## 2024-02-03 PROCEDURE — 210N000002 HC R&B HEART CARE

## 2024-02-03 PROCEDURE — 250N000013 HC RX MED GY IP 250 OP 250 PS 637: Performed by: INTERNAL MEDICINE

## 2024-02-03 PROCEDURE — 83735 ASSAY OF MAGNESIUM: CPT | Performed by: INTERNAL MEDICINE

## 2024-02-03 PROCEDURE — 84484 ASSAY OF TROPONIN QUANT: CPT | Performed by: INTERNAL MEDICINE

## 2024-02-03 RX ORDER — METOPROLOL SUCCINATE 25 MG/1
25 TABLET, EXTENDED RELEASE ORAL DAILY
Status: DISCONTINUED | OUTPATIENT
Start: 2024-02-03 | End: 2024-02-04 | Stop reason: HOSPADM

## 2024-02-03 RX ORDER — MAGNESIUM OXIDE 400 MG/1
400 TABLET ORAL EVERY 4 HOURS
Status: COMPLETED | OUTPATIENT
Start: 2024-02-03 | End: 2024-02-04

## 2024-02-03 RX ADMIN — METOPROLOL SUCCINATE 25 MG: 25 TABLET, EXTENDED RELEASE ORAL at 09:14

## 2024-02-03 RX ADMIN — ACETAMINOPHEN 650 MG: 325 TABLET, FILM COATED ORAL at 09:13

## 2024-02-03 RX ADMIN — ACETAMINOPHEN 650 MG: 325 TABLET, FILM COATED ORAL at 02:16

## 2024-02-03 RX ADMIN — ASPIRIN 81 MG: 81 TABLET, COATED ORAL at 09:14

## 2024-02-03 RX ADMIN — MAGNESIUM OXIDE TAB 400 MG (241.3 MG ELEMENTAL MG) 400 MG: 400 (241.3 MG) TAB at 23:10

## 2024-02-03 ASSESSMENT — ACTIVITIES OF DAILY LIVING (ADL)
ADLS_ACUITY_SCORE: 18

## 2024-02-03 NOTE — PLAN OF CARE
Goal Outcome Evaluation:  Summary:    Patient Name: Lorena Tolbert   Room#: 252   Admit Date: 2024  Primary Diagnosis: NSTEMI   Inpatient Status: inpatient   Needed? no  Procedures This Admit:  echo,angio (R radial)  Drips:  NS at 50cc/h  Drains & Devices:  none  Rhythm:  NSR  Pain: headache, Tylenol with effect  Activity Level: indp  Diet: regular  Oxygen needs: RA  Important Labs Since Admit: K/Mg protocol, troponin peak to 415. Repeat Troponins this a.m.  Significant Echo results: 55-60%  Anticipated D/C Date: 2/3/-    Acuity Ratin  Requiring Extra Time? (Please explain): NA    Other: Troponins pending this a.m.. Metoprolol increased    Nurse: Gloria Neal RN

## 2024-02-03 NOTE — PLAN OF CARE
Goal Outcome Evaluation: Pt is A&Ox4, denies CP, VSS and on tele SR, on RA and LS clear, R radial site C/D/I good pulse and CMS, up with SBA, NS at 50 ml/hr, D-Dimer negative. Continue to monitor.      Plan of Care Reviewed With: patient, spouse    Overall Patient Progress: no changeOverall Patient Progress: no change

## 2024-02-03 NOTE — PROGRESS NOTES
"       Assessment and Plan:     Ms. Tolbert is a 53 year old female admitted to the hospital with a myocardial infarction with reportedly normal coronary arteries (MINOCA).     She hemodynamically and electrically stable without recurrence of chest discomfort. The etiology of her myocardial infarction is unclear but this may represent a primary vasospastic event that resolved by the time of angiography vs SCAD / embolic event to a small vessel which is angiographically difficult to visualize. Given the uncertainty surrounding her diagnosis and high recurrence rate if this is SCAD I'd favor watching her an additional day and discharging tomorrow if there is no recurrent chest pain.     #Myocardial infarction with normal coronary arteries vs vasospasm, SCAD, small vessel embolism or other etiology  #Mild LAD bridging     Plan:   -Repeat troponin  -Continue asa 81mg, start metoprolol succinate 25mg  -If no recurrent chest pain then can discharge tomorrow with outpatient follow up and PRN nitroglycerin for recurrence  -Outpatient team can consider CMR to assess for scar in a coronary distribution signifying infarct. The angiogram could then be scrutinized in that territory to see if SCAD of a small vessel or a vessel is missing in that territory.       Vito Smallwood MD        Interval History:   No recurrent chest pain. Feeling well this AM         Medications:   I have reviewed this patient's current medications         Physical Exam:   Blood pressure (!) 126/96, pulse 83, temperature 98  F (36.7  C), temperature source Oral, resp. rate 16, height 1.651 m (5' 5\"), weight 88 kg (193 lb 14.4 oz), SpO2 96%.  Vitals:    02/01/24 1924 02/02/24 0542 02/03/24 0207   Weight: 89.1 kg (196 lb 8 oz) 88 kg (193 lb 14.4 oz) 88 kg (193 lb 14.4 oz)         Intake/Output Summary (Last 24 hours) at 2/3/2024 0501  Last data filed at 2/2/2024 1300  Gross per 24 hour   Intake 307.5 ml   Output --   Net 307.5 ml       01/28 2300 - 02/02 " "2259  In: 507.5 [P.O.:200; I.V.:307.5]  Out: -   Net: 507.5    Exam:  GENERAL APPEARANCE ADULT: alert in no acute distress  RESP: decreased air exchange bilaterally, breathing comfortably on RA  CV: JVP normal, normal s1 /s2, slight RV heave, no clear murmurs  EXTREMITIES: no le edema         Data:   LABS (Last four results)  CMP  Recent Labs   Lab 02/03/24  0216 02/02/24  0538 02/01/24  2228   NA  --  138  --    POTASSIUM  --  4.2 3.6   CHLORIDE  --  105  --    CO2  --  26  --    ANIONGAP  --  7  --    GLC 90 91  --    BUN  --  14.2  --    CR  --  0.73 0.76   GFRESTIMATED  --  >90 >90   BETSY  --  9.3  --    MAG  --  1.8 1.7   PROTTOTAL  --  6.1*  --    ALBUMIN  --  3.8  --    BILITOTAL  --  0.4  --    ALKPHOS  --  41  --    AST  --  27  --    ALT  --  13  --      CBC  Recent Labs   Lab 02/02/24  0538 02/01/24  2228   WBC 5.7 6.3   RBC 4.53 4.30   HGB 13.1 12.5   HCT 40.3 38.4   MCV 89 89   MCH 28.9 29.1   MCHC 32.5 32.6   RDW 15.8* 15.8*    192     INR  Recent Labs   Lab 02/02/24  0538   INR 1.06     TROPONINS No results found for: \"TROPI\", \"TROPONIN\", \"TROPR\", \"TROPN\"                                                                                                            Vito Smallwood MD   "

## 2024-02-03 NOTE — PROGRESS NOTES
"[facility]    Medicine Progress Note - Hospitalist Service    Date of Admission:  2/1/2024    Assessment & Plan   Chest pain with troponin elevation of unclear etiology (SCAD, small vessel embolism, arrhythmia, etc)  P/w chest pain, dizziness, nausea, emesis  to an outside hospital with reported negative EKG found to have troponin elevation consistent with NSTEMI and was transferred to this facility.  She has remained cp free  Echocardiogram with some aortic root dilation but no wall motion abnormalities  Cardiology consulted and underwent an angiogram on 2/2 without significant coronary artery disease except for a segment of intramyocardial bridging with mild systolic compression in the midLAD (this would not be expected to cause angina or NSTEMI).   D-dimer negative  plan  - ASA 81 mg daily  - no indication for continuation of statin  - continue the metoprolol  - monitor closely today  - will need follow-up with cardiology as outpatient, potentially an cMRI        Neck pain, chronic and likely related to musculoskeletal issues  She also reports occ nocturnal hand numbness that resolves upon waking bilaterally, and she may benefit from cervical imaging, discussed with her to discuss with her PCP        Diet: Combination Diet Regular Diet    DVT Prophylaxis: Pneumatic Compression Devices  Covarrubias Catheter: Not present  Lines: PRESENT             Cardiac Monitoring: ACTIVE order. Indication: Post- PCI/Angiogram (24 hours)  Code Status: Full Code      Clinically Significant Risk Factors            # Hypomagnesemia: Lowest Mg = 1.6 mg/dL in last 2 days, will replace as needed              # Obesity: Estimated body mass index is 32.27 kg/m  as calculated from the following:    Height as of this encounter: 1.651 m (5' 5\").    Weight as of this encounter: 88 kg (193 lb 14.4 oz)., PRESENT ON ADMISSION            Disposition Plan     Expected Discharge Date: 02/03/2024                    Dinh MEDINA" DO [facility]  Hospitalist Service  [facility]  Securely message with "[employer] (more info)  Text page via [employer] Paging/Directory   ______________________________________________________________________    Interval History   No major chest pain  She is concerned that nothing concerning has been found    Physical Exam   Vital Signs: Temp: 98  F (36.7  C) Temp src: Oral BP: (!) 126/96 Pulse: 83   Resp: 16 SpO2: 96 % O2 Device: None (Room air)    Weight: 193 lbs 14.4 oz    Constitutional: Awake, alert, cooperative, no apparent distress.    ENT: Normocephalic, without obvious abnormality, atraumatic. Eye pupils are equal. Normal sclera.    Neck: Supple, symmetrical, trachea midline  Pulmonary: No increased work of breathing, good air exchange, clear to auscultation bilaterally  Cardiovascular: Regular rate and rhythm, normal S1 and S2, no S3 or S4, and no murmur noted.  GI: Normal bowel sounds, soft, non-distended, non-tender.   Skin: Visualized skin appeared clear.  Neuro: Oriented x 3. Moves all extremities spontaneously. No focal neuro deficits.  Psych:  Normal affect and mood  Extremities: Normal muscle tone. No gross deformities noted. Calves non-tender b/l. No pitting edema b/l LE    Medical Decision Making       60 MINUTES SPENT BY ME on the date of service doing chart review, history, exam, documentation & further activities per the note.      Data     I have personally reviewed the following data over the past 24 hrs:    N/A  \   N/A   / N/A     N/A N/A N/A /  90   4.0 N/A N/A \     Trop: 194 (HH) BNP: N/A     INR:  N/A PTT:  N/A   D-dimer:  0.27 Fibrinogen:  N/A       Imaging results reviewed over the past 24 hrs:   Recent Results (from the past 24 hour(s))   Cardiac Catheterization    Narrative    Coronary arteries appear angiographically normal except for a segment of   intramyocardial bridging with mild systolic compression in the midLAD   (this would not be expected to cause angina  or NSTEMI).

## 2024-02-03 NOTE — PROGRESS NOTES
02/03/24 9313   Appointment Info   Signing Clinician's Name / Credentials (PT) Ambar Albrecht, PT, DPT   Rehab Comments (PT) Cardiac Rehab   Living Environment   People in Home spouse;grandchild(tarik);child(tarik), adult   Current Living Arrangements house   Home Accessibility stairs to enter home;stairs within home   Number of Stairs, Main Entrance 1   Number of Stairs, Within Home, Primary greater than 10 stairs   Stair Railings, Within Home, Primary railing on left side (ascending)   Transportation Anticipated car, drives self;family or friend will provide   Living Environment Comments Pt lives in house, all needs on main level of home, does have basement.   Self-Care   Usual Activity Tolerance good   Current Activity Tolerance fair   Regular Exercise   (just started up this week again, uses a stepper.)   Equipment Currently Used at Home none   Fall history within last six months yes   Number of times patient has fallen within last six months 2  (couple times, fell when getting gas, hit heaad on truck car foor.)   Activity/Exercise/Self-Care Comment Pt is ind with ADLs and IADLs at baseline. 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 Pt reports just got back into exercising on a stepper and ab machine was able to workout Mon-Wed this week before having symptoms later in the week.   General Information   Onset of Illness/Injury or Date of Surgery 02/01/24   Referring Physician Dinh Naylor, DO   Patient/Family Therapy Goals Statement (PT) Planning on going home when able. Open to OP CR.   Pertinent History of Current Problem (include personal factors and/or comorbidities that impact the POC) Pt is a 53 year old female with PMH of anxiety, iron deficiency anemia who presented to [facility] ED on 2/1/24 for evaluation of chest pain, dizziness, nausea, emesis x 1. She woke up with the chest pain, which radiated into her left arm and was  described as a tightness. Pt underwent coronary angiogram on 2/2, no intervention performed. Per cardiology pt differential diagnosis includes Myocardial infarction with normal coronary arteries vs vasospasm, SCAD, small vessel embolism or other etiology, Mild LAD bridging.   Existing Precautions/Restrictions cardiac;fall;lifting   Heart Disease Risk Factors Overweight;Stress;Family history;Medical history;Lack of physical activity   Cognition   Affect/Mental Status (Cognition) WFL;anxious   Orientation Status (Cognition) oriented x 4   Follows Commands (Cognition) WFL   Pain Assessment   Patient Currently in Pain No   Integumentary/Edema   Integumentary/Edema Comments dressing on R radial incision appears CDI.   Posture    Posture Forward head position;Protracted shoulders   Range of Motion (ROM)   ROM Comment Grossly WFL BUE and LE with functional transfers.   Strength (Manual Muscle Testing)   Strength (Manual Muscle Testing) Able to perform R SLR;Able to perform L SLR   Strength Comments grossly antigravity strength BUE and LE with functional transfers.   Bed Mobility   Comment, (Bed Mobility) NA, pt reports ind.   Transfers   Comment, (Transfers) sit>stand, ind.   Gait/Stairs (Locomotion)   Comment, (Gait/Stairs) amb with no AD, ind.   Balance   Balance no deficits were identified   Sensory Examination   Sensory Perception patient reports no sensory changes   Clinical Impression   Criteria for Skilled Therapeutic Intervention Yes, treatment indicated   PT Diagnosis (PT) Impaired functional mobility   Influenced by the following impairments lifting precautions post MI, decreased activity tolerance, dyspnea   Functional limitations due to impairments impaired functional independence   Clinical Presentation (PT Evaluation Complexity) stable   Clinical Presentation Rationale per MR and clinical judgement   Clinical Decision Making (Complexity) low complexity   Planned Therapy Interventions (PT) gait training;home  exercise program;patient/family education;ROM (range of motion);stair training;strengthening;stretching;transfer training;progressive activity/exercise;risk factor education;home program guidelines   Risk & Benefits of therapy have been explained evaluation/treatment results reviewed;care plan/treatment goals reviewed;risks/benefits reviewed;current/potential barriers reviewed;participants voiced agreement with care plan;participants included;patient   PT Total Evaluation Time   PT Eval, Low Complexity Minutes (68138) 6   Physical Therapy Goals   PT Frequency Daily   PT Predicted Duration/Target Date for Goal Attainment 02/06/24   PT Goals Cardiac Phase 1   PT: Understanding of cardiac education to maximize quality of life, condition management, and health outcomes Patient;Verbalize;Demonstrate   PT: Perform aerobic activity with stable cardiovascular response continuous;10 minutes;ambulation;treadmill   PT: Functional/aerobic ambulation tolerance with stable cardiovascular response in order to return to home and community environment Independent;Greater than 300 feet;Goal Met   PT: Navigation of stairs simulating home set up with stable cardiovascular response in order to return to home and community environment Modified independent;4 stairs;Rail on right   Interventions   Interventions Quick Adds Therapeutic Activity;Therapeutic Procedure   Therapeutic Procedure/Exercise   Ther. Procedure: strength, endurance, ROM, flexibillity Minutes (75631) 10   Treatment Detail/Skilled Intervention see VSFS for details.   Treatment Time Includes (CR Only) See specific exercise details intervention group(s);Monitoring of vital signs (see vital signs flowsheet for details)   Therapeutic Activity   Therapeutic Activities: dynamic activities to improve functional performance Minutes (90588) 10   Symptoms Noted During/After Treatment None   Treatment Detail/Skilled Intervention pt agreed to therapy session. Pt completed additional  STS throughout session, ind. Edu and provided pt with cardiac handouts, see card edu section below for details. Pt verbalized understanding. pt left sitting up in chair, all needs in reach, RN updated via [employer] message reguarding vitals.   Ambulation   Workload flat surface hallway   Duration (minutes) 3 minutes   Effort Scale 6   Symptoms Dyspnea;Fatigue   Cardiovascular Response Hypertension at rest;Hypertensive response to exercise   Exercise Details pt amb in room and hallway, brisk pace, approx 400', ind. Pt able to maintain conversation, starts to have slight dyspnea. cues for PLB throughout   Cardiac Education   Education Provided OMNI Scale;Home exercise program;Energy conservation;Outpatient Cardiac Rehab;Precautions;Resuming home activities;Signs and symptoms;Stop light tool   Education Packet Given to Patient Yes   All Patient Education Handouts Reviewed with Patient and/or Family No   Cardiac Rehab Phase II Plan   Phase II Order Received Yes   Phase II Appointment Status Not scheduled   Not Scheduled Reason   (order has not been released.)   PT Discharge Planning   PT Plan treadmill, stairs, review card education, heart disease risk factors   PT Discharge Recommendation (DC Rec) home with assist;home with outpatient cardiac rehab   PT Rationale for DC Rec Pt tolerated session well. 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 Pt ind with mobility. Pt HTN at rest and hypertensive response to exercise this date. Anticipate when medically ready, pt may discharge to home with assist as needed, recommend pt continue with OP CR phase II. Will continue to update as appropriate.   PT Brief overview of current status ind. dyspnea, hypertensive response to exercise.   Total Session Time   Timed Code Treatment Minutes 20   Total Session Time (sum of timed and untimed services) 26

## 2024-02-03 NOTE — PLAN OF CARE
Assessment and plan  Principal hospital problem: NSTEMI (non-ST elevated myocardial infarction)   Coping/psychosocial: calm, cooperative.  Cognitive: alert and oriented x 4.   Vital signs: stable on room air.   Cardiovascular: denied chest pain.  Pulmonary: denied shortness of breath. In no apparent respiratory distress. Lung sounds clear and equal bilaterally.  Musculoskeletal: generalized weakness.  Pain: complained of headache this morning, managed well with tylenol.  IV Access/drains: saline locked.   Wound/Skin: skin is warm, dry, and intact. No redness, cyanosis, or rash noted.  GI/: denied nausea and vomiting, active bowel sounds.  Covarrubias catheter: not present.  Diet: Reg diet, thin liquids.   Activity: independent.  Safety: safety rounds completed.    End of shift summary: No significant events this shift. Report given to oncoming shift RN. Discharge pending safe disposition plan.

## 2024-02-04 ENCOUNTER — APPOINTMENT (OUTPATIENT)
Dept: PHYSICAL THERAPY | Facility: CLINIC | Age: 54
DRG: 282 | End: 2024-02-04
Attending: INTERNAL MEDICINE
Payer: COMMERCIAL

## 2024-02-04 VITALS
BODY MASS INDEX: 32.3 KG/M2 | OXYGEN SATURATION: 97 % | SYSTOLIC BLOOD PRESSURE: 142 MMHG | HEART RATE: 79 BPM | RESPIRATION RATE: 18 BRPM | WEIGHT: 193.9 LBS | HEIGHT: 65 IN | TEMPERATURE: 97.9 F | DIASTOLIC BLOOD PRESSURE: 98 MMHG

## 2024-02-04 PROCEDURE — 97110 THERAPEUTIC EXERCISES: CPT | Mod: GP

## 2024-02-04 PROCEDURE — 99239 HOSP IP/OBS DSCHRG MGMT >30: CPT | Performed by: HOSPITALIST

## 2024-02-04 PROCEDURE — 99231 SBSQ HOSP IP/OBS SF/LOW 25: CPT | Performed by: INTERNAL MEDICINE

## 2024-02-04 PROCEDURE — 250N000013 HC RX MED GY IP 250 OP 250 PS 637: Performed by: PHYSICIAN ASSISTANT

## 2024-02-04 PROCEDURE — 97530 THERAPEUTIC ACTIVITIES: CPT | Mod: GP

## 2024-02-04 PROCEDURE — 250N000013 HC RX MED GY IP 250 OP 250 PS 637: Performed by: INTERNAL MEDICINE

## 2024-02-04 RX ORDER — ASPIRIN 81 MG/1
81 TABLET ORAL DAILY
Qty: 30 TABLET | Refills: 0 | Status: SHIPPED | OUTPATIENT
Start: 2024-02-04

## 2024-02-04 RX ORDER — NITROGLYCERIN 0.4 MG/1
TABLET SUBLINGUAL
Qty: 25 TABLET | Refills: 0 | Status: SHIPPED | OUTPATIENT
Start: 2024-02-04

## 2024-02-04 RX ORDER — METOPROLOL SUCCINATE 25 MG/1
25 TABLET, EXTENDED RELEASE ORAL DAILY
Qty: 30 TABLET | Refills: 0 | Status: SHIPPED | OUTPATIENT
Start: 2024-02-04 | End: 2024-02-12

## 2024-02-04 RX ADMIN — MAGNESIUM OXIDE TAB 400 MG (241.3 MG ELEMENTAL MG) 400 MG: 400 (241.3 MG) TAB at 03:12

## 2024-02-04 RX ADMIN — ASPIRIN 81 MG: 81 TABLET, COATED ORAL at 09:10

## 2024-02-04 RX ADMIN — METOPROLOL SUCCINATE 25 MG: 25 TABLET, EXTENDED RELEASE ORAL at 00:10

## 2024-02-04 RX ADMIN — ACETAMINOPHEN 650 MG: 325 TABLET, FILM COATED ORAL at 03:12

## 2024-02-04 ASSESSMENT — ACTIVITIES OF DAILY LIVING (ADL)
ADLS_ACUITY_SCORE: 18

## 2024-02-04 NOTE — DISCHARGE SUMMARY
"[facility]  Hospitalist Discharge Summary      Date of Admission:  2/1/2024  Date of Discharge:  2/4/2024  Discharging Provider: Dinh Naylor DO  Discharge Service: Hospitalist Service    Discharge Diagnoses   NSTEMI, unclear etiology    Clinically Significant Risk Factors     # Obesity: Estimated body mass index is 32.27 kg/m  as calculated from the following:    Height as of this encounter: 1.651 m (5' 5\").    Weight as of this encounter: 88 kg (193 lb 14.4 oz).       Follow-ups Needed After Discharge   Follow-up Appointments     Follow-up and recommended labs and tests       Follow up with primary care provider, [facility], within 7   days to evaluate medication change and for hospital follow- up.  No follow   up labs or test are needed.      Follow-up with [facility] Cardiology first available. They will   call you for an appointment.            Unresulted Labs Ordered in the Past 30 Days of this Admission       No orders found from 1/2/2024 to 2/2/2024.        These results will be followed up by PCP    Discharge Disposition   Discharged to home  Condition at discharge: Stable    Hospital Course   Chest pain with troponin elevation of unclear etiology (SCAD, small vessel embolism, arrhythmia, etc)  P/w chest pain, dizziness, nausea, emesis  to an outside hospital with reported negative EKG found to have troponin elevation consistent with NSTEMI and was transferred to this facility.  She has remained cp free  Echocardiogram with some aortic root dilation but no wall motion abnormalities  Cardiology consulted and underwent an angiogram on 2/2 without significant coronary artery disease except for a segment of intramyocardial bridging with mild systolic compression in the midLAD (this would not be expected to cause angina or NSTEMI).   D-dimer negative  Hgb of 5.3  plan  - ASA 81 mg daily  - no indication for continuation of statin  - continue the metoprolol  - " outpatient cardiac rehab  - will need follow-up with cardiology as outpatient, potentially an cMRI  - prn nitroglycerin rx on discharge        Neck pain, chronic and likely related to musculoskeletal issues  She also reports occ nocturnal hand numbness that resolves upon waking bilaterally, and she may benefit from cervical imaging, discussed with her to discuss with her PCP    Consultations This Hospital Stay   CARDIOLOGY IP CONSULT  PHARMACY IP CONSULT  PHARMACY IP CONSULT  PHARMACY IP CONSULT  CARDIAC REHAB IP CONSULT  SMOKING CESSATION PROGRAM IP CONSULT    Code Status   Full Code    Time Spent on this Encounter   I, Dinh Naylor DO, personally saw the patient today and spent greater than 30 minutes discharging this patient.       Dinh Naylor DO  [facility]  [address]  [facility] [address]  Phone: [phone]  ______________________________________________________________________    Physical Exam   Vital Signs: Temp: 97.9  F (36.6  C) Temp src: Oral BP: 132/87 Pulse: 72   Resp: 18 SpO2: 96 % O2 Device: None (Room air)    Weight: 193 lbs 14.4 oz      Constitutional: Awake, alert, cooperative, no apparent distress.    Pulmonary: No increased work of breathing, good air exchange, clear to auscultation bilaterally  Cardiovascular: Regular rate and rhythm, normal S1 and S2, no S3 or S4, and no murmur noted.     Primary Care Physician   [facility]    Discharge Orders      Follow-Up with Cardiology      CARDIAC REHAB REFERRAL      Reason for your hospital stay    Chest pain of uncertain etiology     Follow-up and recommended labs and tests     Follow up with primary care provider, [facility], within 7 days to evaluate medication change and for hospital follow- up.  No follow up labs or test are needed.      Follow-up with [facility] Cardiology first available. They will call you for an appointment.     Activity     Your activity upon discharge: activity as tolerated     Diet    Follow this diet upon discharge: Orders Placed This Encounter      Combination Diet Regular Diet       Significant Results and Procedures   Most Recent 3 CBC's:  Recent Labs   Lab Test 24  0538 24  2228   WBC 5.7 6.3   HGB 13.1 12.5   MCV 89 89    192     Most Recent 3 BMP's:  Recent Labs   Lab Test 24  1104 24  0216 24  0538 24  2228   NA  --   --  138  --    POTASSIUM 4.0  --  4.2 3.6   CHLORIDE  --   --  105  --    CO2  --   --  26  --    BUN  --   --  14.2  --    CR  --   --  0.73 0.76   ANIONGAP  --   --  7  --    BETSY  --   --  9.3  --    GLC  --  90 91  --      Most Recent 2 LFT's:  Recent Labs   Lab Test 24  0538   AST 27   ALT 13   ALKPHOS 41   BILITOTAL 0.4   ,   Results for orders placed or performed during the hospital encounter of 24   Echocardiogram Complete     Value    LVEF  55-60%    Narrative    [id number]  [id number]  [id number]  [id number]^ALBERT^HALIMA^MEMO     [facility]  Echocardiography Laboratory  [address] [place] [address]     Name: SARA CABRAL  MRN: [id number]  : 1970  Study Date: 2024 09:54 AM  Age: 53 yrs  Gender: Female  Patient Location: [facility]  Reason For Study: Chest Pain  Ordering Physician: HALIMA PRICE  Referring Physician: SYSTEM, PROVIDER NOT IN  Performed By: Melania Spicer     BSA: 2.0 m2  Height: 65 in  Weight: 196 lb  HR: 64  BP: 154/84 mmHg  ______________________________________________________________________________  Procedure  Complete Echo Adult. Optison (NDC #[id number]) given intravenously.  ______________________________________________________________________________  Interpretation Summary     The visual ejection fraction is 55-60%.  No regional wall motion abnormalities noted.  Normal tricuspid aortic valve  There is trace aortic regurgitation.  Aortic root dilatation is present.  Ascending aorta  dilatation is present.  ______________________________________________________________________________  Left Ventricle  The left ventricle is normal in size. There is normal left ventricular wall  thickness. Diastolic Doppler findings (E/E' ratio and/or other parameters)  suggest left ventricular filling pressures are indeterminate. The visual  ejection fraction is 55-60%. No regional wall motion abnormalities noted.     Right Ventricle  The right ventricle is normal in size and function.     Atria  Normal left atrial size. Right atrial size is normal. There is no color  Doppler evidence of an atrial shunt.     Mitral Valve  The mitral valve leaflets are mildly thickened. There is trace mitral  regurgitation.     Tricuspid Valve  There is trace tricuspid regurgitation. IVC diameter and respiratory changes  fall into an intermediate range suggesting an RA pressure of 8 mmHg.     Aortic Valve  Normal tricuspid aortic valve. There is trace aortic regurgitation.     Pulmonic Valve  There is trace pulmonic valvular regurgitation.     Vessels  Aortic root dilatation is present. Ascending aorta dilatation is present.     Pericardium  There is no pericardial effusion.     Rhythm  Sinus rhythm was noted.  ______________________________________________________________________________  MMode/2D Measurements & Calculations  IVSd: 0.99 cm     LVIDd: 3.9 cm  LVPWd: 0.98 cm  LV mass(C)d: 117.2 grams  LV mass(C)dI: 59.8 grams/m2  Ao root diam: 3.8 cm  LA dimension: 2.2 cm  asc Aorta Diam: 4.0 cm  LA/Ao: 0.58  LVOT diam: 2.1 cm  LVOT area: 3.4 cm2  Ao root diam index Ht(cm/m): 2.3  Ao root diam index BSA (cm/m2): 1.9  Asc Ao diam index BSA (cm/m2): 2.1  Asc Ao diam index Ht(cm/m): 2.4  LA Volume (BP): 27.9 ml     LA Volume Index (BP): 14.2 ml/m2  RV Base: 2.7 cm  RWT: 0.51  TAPSE: 2.4 cm     Doppler Measurements & Calculations  MV E max carrie: 62.6 cm/sec  MV A max carrie: 102.4 cm/sec  MV E/A: 0.61  MV dec slope: 269.2 cm/sec2  MV dec  time: 0.23 sec  Ao V2 max: 139.5 cm/sec  Ao max P.0 mmHg  Ao V2 mean: 92.1 cm/sec  Ao mean P.0 mmHg  Ao V2 VTI: 30.0 cm  TRISHA(I,D): 2.4 cm2  TRISHA(V,D): 2.1 cm2  LV V1 max PG: 3.0 mmHg  LV V1 max: 86.5 cm/sec  LV V1 VTI: 21.4 cm  SV(LVOT): 72.8 ml  SI(LVOT): 37.1 ml/m2  PA acc time: 0.11 sec  AV Ja Ratio (DI): 0.62  TRISHA Index (cm2/m2): 1.2  E/E' av.4  Lateral E/e': 6.1     Medial E/e': 8.6  RV S Ja: 11.6 cm/sec     ______________________________________________________________________________  Report approved by: Dangelo Young 2024 01:24 PM         Cardiac Catheterization    Narrative    Coronary arteries appear angiographically normal except for a segment of   intramyocardial bridging with mild systolic compression in the midLAD   (this would not be expected to cause angina or NSTEMI).          Discharge Medications   Current Discharge Medication List        START taking these medications    Details   aspirin 81 MG EC tablet Take 1 tablet (81 mg) by mouth daily  Qty: 30 tablet, Refills: 0    Associated Diagnoses: NSTEMI (non-ST elevated myocardial infarction) (H)      metoprolol succinate ER (TOPROL XL) 25 MG 24 hr tablet Take 1 tablet (25 mg) by mouth daily  Qty: 30 tablet, Refills: 0    Associated Diagnoses: NSTEMI (non-ST elevated myocardial infarction) (H)      nitroGLYcerin (NITROSTAT) 0.4 MG sublingual tablet For chest pain place 1 tablet under the tongue every 5 minutes for 3 doses. If symptoms persist 5 minutes after 1st dose call 911.  Qty: 25 tablet, Refills: 0    Associated Diagnoses: NSTEMI (non-ST elevated myocardial infarction) (H)           CONTINUE these medications which have NOT CHANGED    Details   Cyanocobalamin (VITAMIN B12 PO) Take 1 tablet by mouth daily      Ferrous Sulfate (IRON PO) Take 1 tablet by mouth daily      VITAMIN D PO Take 1 tablet by mouth daily           Allergies   No Known Allergies

## 2024-02-04 NOTE — PROGRESS NOTES
"       Assessment and Plan:     Ms. Tolbert is a 53 year old female admitted to the hospital with a myocardial infarction with reportedly normal coronary arteries (MINOCA).     #Myocardial infarction with normal coronary arteries vs vasospasm, SCAD, small vessel embolism or other etiology  #Mild LAD bridging     Plan:     -OK from a cardiology standpoint to discharge today, please discharge with prescription for nitroglycerin SL PRN  -Continue asa 81mg, metoprolol succinate 25mg  -Outpatient team can consider CMR to assess for scar in a coronary distribution signifying infarct. The angiogram could then be scrutinized in that territory to see if SCAD of a small vessel or a vessel is missing in that territory. Otherwise vasospasm could be considered as an etiology for her presentation      Vito Smallwood MD        Interval History:   No recurrent chest pain. Feeling well this AM         Medications:   I have reviewed this patient's current medications         Physical Exam:   Blood pressure 113/76, pulse 65, temperature 98.1  F (36.7  C), temperature source Oral, resp. rate 17, height 1.651 m (5' 5\"), weight 88 kg (193 lb 14.4 oz), SpO2 96%.  Vitals:    02/01/24 1924 02/02/24 0542 02/03/24 0207   Weight: 89.1 kg (196 lb 8 oz) 88 kg (193 lb 14.4 oz) 88 kg (193 lb 14.4 oz)         Intake/Output Summary (Last 24 hours) at 2/3/2024 0501  Last data filed at 2/2/2024 1300  Gross per 24 hour   Intake 307.5 ml   Output --   Net 307.5 ml       01/29 2300 - 02/03 2259  In: 907.5 [P.O.:200; I.V.:707.5]  Out: -   Net: 907.5    Exam:  GENERAL APPEARANCE ADULT: alert in no acute distress  RESP: decreased air exchange bilaterally, breathing comfortably on RA  CV: JVP normal, normal s1 /s2, slight RV heave, no clear murmurs  EXTREMITIES: no le edema         Data:   LABS (Last four results)  CMP  Recent Labs   Lab 02/03/24  1104 02/03/24  0216 02/02/24  0538 02/01/24  2228   NA  --   --  138  --    POTASSIUM 4.0  --  4.2 3.6 " "  CHLORIDE  --   --  105  --    CO2  --   --  26  --    ANIONGAP  --   --  7  --    GLC  --  90 91  --    BUN  --   --  14.2  --    CR  --   --  0.73 0.76   GFRESTIMATED  --   --  >90 >90   BETSY  --   --  9.3  --    MAG 1.6*  --  1.8 1.7   PROTTOTAL  --   --  6.1*  --    ALBUMIN  --   --  3.8  --    BILITOTAL  --   --  0.4  --    ALKPHOS  --   --  41  --    AST  --   --  27  --    ALT  --   --  13  --      CBC  Recent Labs   Lab 02/02/24  0538 02/01/24  2228   WBC 5.7 6.3   RBC 4.53 4.30   HGB 13.1 12.5   HCT 40.3 38.4   MCV 89 89   MCH 28.9 29.1   MCHC 32.5 32.6   RDW 15.8* 15.8*    192     INR  Recent Labs   Lab 02/02/24  0538   INR 1.06     TROPONINS No results found for: \"TROPI\", \"TROPONIN\", \"TROPR\", \"TROPN\"                                                                                                            Vito Smallwood MD   "

## 2024-02-04 NOTE — PLAN OF CARE
Cardiac Rehab Discharge Summary    Reason for therapy discharge:    Discharged to home with outpatient therapy.    Progress towards therapy goal(s). See goals on Care Plan in [facility] electronic health record for goal details.  Goals met    Therapy recommendation(s):    Continued therapy is recommended.  Rationale/Recommendations:  Continue home exercise program.  Pt tolerated session well. 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 Pt ind with mobility. Pt HTN at rest and hypertensive response to exercise this date. Anticipate when medically ready, pt may discharge to home with assist as needed, recommend pt continue with OP CR phase II. Will continue to update as appropriate.  PT Brief overview of current status: ind. dyspnea, hypertensive response to exercise.

## 2024-02-04 NOTE — PLAN OF CARE
Orientation: A&Ox4. Able to make needs known    Vitals/Tele: SR hr 60's. SpO2>95% on RA. C/o headache overnight, prn given w/improvement     IV Access/drains: R PIV    Diet: Reg diet    Mobility: Independent w/ ambulation    GI/: continent, voiding adequately.     Wound/Skin: No skin issues noted upon assessment. R radial site CDI, Band-Aid in place.     Consults: cardiac rehab     Discharge Plan: discharge today, follow up w/cards outpatient       See Flow sheets for assessment

## 2024-02-04 NOTE — PLAN OF CARE
Goal Outcome Evaluation:      Plan of Care Reviewed With: patient  Recd discharge order. Tolerated cardiac rehab well/ Review discharge instructions. Given 3 new RX with review with patient. Discharge to home with .

## 2024-02-06 ENCOUNTER — TELEPHONE (OUTPATIENT)
Dept: CARDIOLOGY | Facility: CLINIC | Age: 54
End: 2024-02-06
Payer: COMMERCIAL

## 2024-02-06 LAB
ATRIAL RATE - MUSE: 61 BPM
DIASTOLIC BLOOD PRESSURE - MUSE: NORMAL MMHG
INTERPRETATION ECG - MUSE: NORMAL
P AXIS - MUSE: 39 DEGREES
PR INTERVAL - MUSE: 188 MS
QRS DURATION - MUSE: 88 MS
QT - MUSE: 432 MS
QTC - MUSE: 434 MS
R AXIS - MUSE: 61 DEGREES
SYSTOLIC BLOOD PRESSURE - MUSE: NORMAL MMHG
T AXIS - MUSE: 67 DEGREES
VENTRICULAR RATE- MUSE: 61 BPM

## 2024-02-06 NOTE — TELEPHONE ENCOUNTER
Patient was admitted to [facility] on 2/1/24 with chest tightness, pressure, nausea and vomiting with elevated troponin. NSTEMI.    PMH: obesity, remote history of tobacco use (quit at age 30).     2/2/24: Echo showed EF of 55-60%. No regional wall motion abnormalities noted. Normal tricuspid aortic valve. There is trace aortic regurgitation. Aortic root dilatation is present. Ascending aorta dilatation is present.    2/2/24: Coronary angiogram via RRA showed coronary arteries appear angiographically normal except for a segment of intramyocardial bridging with mild systolic compression in the midLAD (this would not be expected to cause angina or NSTEMI) (MINOCA).     Chest pain with troponin elevation of unclear etiology (SCAD, small vessel embolism, arrhythmia, etc).    Pt was started on ASA, Metoprolol and NTG at time of discharge. Consider OP cMRI.    Called patient to discuss any post hospital d/c questions she may have, review medication changes, and confirm f/u appts. Patient denied any questions regarding new medications or changes with their PTA medications.    Patient denied any SOB, chest pain or lightheadedness.     RRA cardiac cath site is without bleeding, swelling, redness or signs of infection.     RN confirmed with patient that she needs to be scheduled for a cardiology ADE OV as ordered. Scheduling and Dr. Obrien's Team RN phone numbers provided.    Patient advised to call clinic with any cardiac related questions or concerns prior to this ade't. Patient verbalized understanding and agreed with plan. DOE Nur RN.

## 2024-02-07 ENCOUNTER — TRANSFERRED RECORDS (OUTPATIENT)
Dept: HEALTH INFORMATION MANAGEMENT | Facility: CLINIC | Age: 54
End: 2024-02-07
Payer: COMMERCIAL

## 2024-02-12 ENCOUNTER — OFFICE VISIT (OUTPATIENT)
Dept: CARDIOLOGY | Facility: CLINIC | Age: 54
End: 2024-02-12
Attending: HOSPITALIST
Payer: COMMERCIAL

## 2024-02-12 VITALS
WEIGHT: 192.7 LBS | HEART RATE: 94 BPM | OXYGEN SATURATION: 99 % | BODY MASS INDEX: 32.11 KG/M2 | SYSTOLIC BLOOD PRESSURE: 110 MMHG | HEIGHT: 65 IN | DIASTOLIC BLOOD PRESSURE: 84 MMHG

## 2024-02-12 DIAGNOSIS — R79.89 ELEVATED TROPONIN LEVEL NOT DUE TO ACUTE CORONARY SYNDROME: Primary | ICD-10-CM

## 2024-02-12 DIAGNOSIS — I21.4 NSTEMI (NON-ST ELEVATED MYOCARDIAL INFARCTION) (H): ICD-10-CM

## 2024-02-12 PROCEDURE — 99215 OFFICE O/P EST HI 40 MIN: CPT | Performed by: INTERNAL MEDICINE

## 2024-02-12 PROCEDURE — 93000 ELECTROCARDIOGRAM COMPLETE: CPT | Performed by: INTERNAL MEDICINE

## 2024-02-12 RX ORDER — METOPROLOL SUCCINATE 25 MG/1
25 TABLET, EXTENDED RELEASE ORAL DAILY
Qty: 90 TABLET | Refills: 3 | Status: SHIPPED | OUTPATIENT
Start: 2024-02-12 | End: 2024-03-05 | Stop reason: ALTCHOICE

## 2024-02-12 NOTE — PROGRESS NOTES
Cardiology Clinic Progress Note:    February 12, 2024   Patient Name: Lorena Tolbert  Patient MRN: [id number]     Consult indication: MINOCA    HPI:    I had the opportunity to see patient Lorena Tolbert in cardiology clinic for a follow up visit. Patient is followed by our colleagues at [facility] with Primary Care.     Patient is a pleasant 53-year-old female who presents for follow-up regarding recent hospitalization for MINOCA.    Patient reports that on the morning of presentation 2/2024 she had been up all night dealing with an emotionally stressful situation.  She has custody of a special needs child with severe autism.  She developed chest pain with radiation to her arm.  No palpitations, URI symptoms, recent vaccines/boosters, recent nausea, vomiting, diarrhea.  Troponin was elevated.  She was seen in consultation by my colleague Dr. Obrien.  Patient was assessed with an echocardiogram that demonstrated normal cardiac function, LVEF 55 to 60%, normal wall motion, trace AI, ascending aorta was 4 cm.  Patient underwent coronary angiography 2/2/2024 that demonstrated angiographically normal coronary arteries except for a segment of mid LAD intramyocardial bridging with mild systolic compression.  She was discharged on aspirin and metoprolol succinate.    Overall patient reports that she feels quite well.  She was seen in the ED at an outside hospital for musculoskeletal pain, CT PE study 2/9/2024 was negative for PE, ascending aorta 3.8 cm.  Patient reports that she has not had recurrence of the chest pain that brought her in initially.  Denies symptoms of dyspnea, abnormal lower extremity swelling, orthopnea/PND.    Patient does report that her brother has hypertrophic cardiomyopathy.  Family history of aortic aneurysm.    Assessment and Plan/Recommendations:    # MINOCA, etiology unclear, could be related to stress cardiomyopathy. Denies any prodromal symptoms suggestive of myopericarditis.   Coronary vasospasm considered.  Coronary angiogram did not report any findings suggestive of SCAD.  # FH of HCM, no FH of SCD    - Reviewed prior cardiac history in detail  - Agree cardiac MRI would be beneficial  - Continue aspirin, metoprolol succinate.  If there is evidence of prior MI, coronary vasospasm would be considered, and we will plan to change metoprolol succinate to diltiazem.  - Recommend screening TTE every 3 to 5 years with PCP given family history of hypertrophic cardiomyopathy, and family history of dilated aorta  - Offered referral to Mental Health, patient declines  - Follow-up in cardiology clinic as needed    Thank you for allowing our team to participate in the care of Lorena Tolbert.  Please do not hesitate to call or page me with any questions or concerns.    Sincerely,     Jd Aldana MD, [facility]  Cardiology  Text Page   February 12, 2024    cc  Dinh Naylor, DO  [address] [place] [address]    Voice recognition software utilized.     Total time spent on this encounter today: Greater than 40 minutes, providing care in this encounter including, but not limited to, reviewing prior medical records, laboratory data, imaging studies, diagnostic studies, procedure notes, formulating an assessment and plan, recommendations, discussion and counseling with patient face to face, dictation.    Past Medical History:   MINOCA    Past Surgical History:   Past Surgical History:   Procedure Laterality Date    CV CORONARY ANGIOGRAM N/A 2/2/2024    Procedure: Coronary Angiogram;  Surgeon: Iliaan Singleton MD;  Location: [facility] CARDIAC CATH LAB       Medications (outpatient):  Current Outpatient Medications   Medication Sig Dispense Refill    aspirin 81 MG EC tablet Take 1 tablet (81 mg) by mouth daily 30 tablet 0    Cyanocobalamin (VITAMIN B12 PO) Take 1 tablet by mouth daily      Ferrous Sulfate (IRON PO) Take 1 tablet by mouth daily      metoprolol succinate ER (TOPROL  "XL) 25 MG 24 hr tablet Take 1 tablet (25 mg) by mouth daily 90 tablet 3    nitroGLYcerin (NITROSTAT) 0.4 MG sublingual tablet For chest pain place 1 tablet under the tongue every 5 minutes for 3 doses. If symptoms persist 5 minutes after 1st dose call 911. 25 tablet 0    VITAMIN D PO Take 1 tablet by mouth daily         Allergies:  No Known Allergies    Social History:   History   Drug Use Unknown      History   Smoking Status    Former    Types: Cigarettes    Quit date: 2000   Smokeless Tobacco    Never     Social History    Substance and Sexual Activity      Alcohol use: Never       Family History:  History reviewed. No pertinent family history.    Review of Systems:   A complete review of systems was negative except as mentioned in the History of Present Illness.     Objective & Physical Exam:  /84 (BP Location: Right arm, Patient Position: Sitting, Cuff Size: Adult Regular)   Pulse 94   Ht 1.651 m (5' 5\")   Wt 87.4 kg (192 lb 11.2 oz)   SpO2 99%   BMI 32.07 kg/m    Wt Readings from Last 2 Encounters:   02/12/24 87.4 kg (192 lb 11.2 oz)   02/03/24 88 kg (193 lb 14.4 oz)     Body mass index is 32.07 kg/m .   Body surface area is 2 meters squared.    Constitutional: appears stated age, in no apparent distress, appears to be well nourished  Head: normocephalic, atraumatic  Neck: supple, trachea midline  Pulmonary: clear to auscultation bilaterally, no wheezes, no rales, no increased work of breathing  Cardiovascular: JVP normal, regular rate, regular rhythm, normal S1 and S2, no S3, S4, no murmur appreciated, no lower extremity edema  Gastrointestinal: no guarding, non-rigid   Neurologic: awake, alert, moves all extremities  Skin: no jaundice, warm on limited exam  Psychiatric: affect is normal, answers questions appropriately, oriented to self and place    Data reviewed:  Lab Results   Component Value Date    WBC 5.7 02/02/2024    RBC 4.53 02/02/2024    HGB 13.1 02/02/2024    HCT 40.3 02/02/2024    MCV " 89 02/02/2024    MCH 28.9 02/02/2024    MCHC 32.5 02/02/2024    RDW 15.8 (H) 02/02/2024     02/02/2024     Sodium   Date Value Ref Range Status   02/02/2024 138 135 - 145 mmol/L Final     Comment:     Reference intervals for this test were updated on 09/26/2023 to more accurately reflect our healthy population. There may be differences in the flagging of prior results with similar values performed with this method. Interpretation of those prior results can be made in the context of the updated reference intervals.      Potassium   Date Value Ref Range Status   02/03/2024 4.0 3.4 - 5.3 mmol/L Final     Chloride   Date Value Ref Range Status   02/02/2024 105 98 - 107 mmol/L Final     Carbon Dioxide (CO2)   Date Value Ref Range Status   02/02/2024 26 22 - 29 mmol/L Final     Anion Gap   Date Value Ref Range Status   02/02/2024 7 7 - 15 mmol/L Final     GLUCOSE BY METER POCT   Date Value Ref Range Status   02/03/2024 90 70 - 99 mg/dL Final     Urea Nitrogen   Date Value Ref Range Status   02/02/2024 14.2 6.0 - 20.0 mg/dL Final     Creatinine   Date Value Ref Range Status   02/02/2024 0.73 0.51 - 0.95 mg/dL Final     GFR Estimate   Date Value Ref Range Status   02/02/2024 >90 >60 mL/min/1.73m2 Final     Calcium   Date Value Ref Range Status   02/02/2024 9.3 8.6 - 10.0 mg/dL Final     Bilirubin Total   Date Value Ref Range Status   02/02/2024 0.4 <=1.2 mg/dL Final     Alkaline Phosphatase   Date Value Ref Range Status   02/02/2024 41 40 - 150 U/L Final     Comment:     Reference intervals for this test were updated on 11/14/2023 to more accurately reflect our healthy population. There may be differences in the flagging of prior results with similar values performed with this method. Interpretation of those prior results can be made in the context of the updated reference intervals.     ALT   Date Value Ref Range Status   02/02/2024 13 0 - 50 U/L Final     Comment:     Reference intervals for this test were updated  on 2023 to more accurately reflect our healthy population. There may be differences in the flagging of prior results with similar values performed with this method. Interpretation of those prior results can be made in the context of the updated reference intervals.       AST   Date Value Ref Range Status   2024 27 0 - 45 U/L Final     Comment:     Reference intervals for this test were updated on 2023 to more accurately reflect our healthy population. There may be differences in the flagging of prior results with similar values performed with this method. Interpretation of those prior results can be made in the context of the updated reference intervals.     Recent Labs   Lab Test 24  0538   CHOL 168  163   HDL 71  72   LDL 81  78   TRIG 79  65      Lab Results   Component Value Date    A1C 5.3 2024        Recent Results (from the past 4320 hour(s))   Echocardiogram Complete   Result Value    LVEF  55-60%    Narrative    [id number]  [id number]  [id number]  [id number]^ALBERT^HALIMA^MEMO     [facility]  Echocardiography Laboratory  [address] [place] [address]     Name: SARA CABRAL  MRN: [id number]  : 1970  Study Date: 2024 09:54 AM  Age: 53 yrs  Gender: Female  Patient Location: [facility]  Reason For Study: Chest Pain  Ordering Physician: HALIMA PRICE  Referring Physician: SYSTEM, PROVIDER NOT IN  Performed By: Melania Spicer     BSA: 2.0 m2  Height: 65 in  Weight: 196 lb  HR: 64  BP: 154/84 mmHg  ______________________________________________________________________________  Procedure  Complete Echo Adult. Optison (NDC #[id number]) given intravenously.  ______________________________________________________________________________  Interpretation Summary     The visual ejection fraction is 55-60%.  No regional wall motion abnormalities noted.  Normal tricuspid aortic valve  There is trace aortic regurgitation.  Aortic root dilatation is  present.  Ascending aorta dilatation is present.  ______________________________________________________________________________  Left Ventricle  The left ventricle is normal in size. There is normal left ventricular wall  thickness. Diastolic Doppler findings (E/E' ratio and/or other parameters)  suggest left ventricular filling pressures are indeterminate. The visual  ejection fraction is 55-60%. No regional wall motion abnormalities noted.     Right Ventricle  The right ventricle is normal in size and function.     Atria  Normal left atrial size. Right atrial size is normal. There is no color  Doppler evidence of an atrial shunt.     Mitral Valve  The mitral valve leaflets are mildly thickened. There is trace mitral  regurgitation.     Tricuspid Valve  There is trace tricuspid regurgitation. IVC diameter and respiratory changes  fall into an intermediate range suggesting an RA pressure of 8 mmHg.     Aortic Valve  Normal tricuspid aortic valve. There is trace aortic regurgitation.     Pulmonic Valve  There is trace pulmonic valvular regurgitation.     Vessels  Aortic root dilatation is present. Ascending aorta dilatation is present.     Pericardium  There is no pericardial effusion.     Rhythm  Sinus rhythm was noted.  ______________________________________________________________________________  MMode/2D Measurements & Calculations  IVSd: 0.99 cm     LVIDd: 3.9 cm  LVPWd: 0.98 cm  LV mass(C)d: 117.2 grams  LV mass(C)dI: 59.8 grams/m2  Ao root diam: 3.8 cm  LA dimension: 2.2 cm  asc Aorta Diam: 4.0 cm  LA/Ao: 0.58  LVOT diam: 2.1 cm  LVOT area: 3.4 cm2  Ao root diam index Ht(cm/m): 2.3  Ao root diam index BSA (cm/m2): 1.9  Asc Ao diam index BSA (cm/m2): 2.1  Asc Ao diam index Ht(cm/m): 2.4  LA Volume (BP): 27.9 ml     LA Volume Index (BP): 14.2 ml/m2  RV Base: 2.7 cm  RWT: 0.51  TAPSE: 2.4 cm     Doppler Measurements & Calculations  MV E max carrie: 62.6 cm/sec  MV A max carrie: 102.4 cm/sec  MV E/A: 0.61  MV dec  slope: 269.2 cm/sec2  MV dec time: 0.23 sec  Ao V2 max: 139.5 cm/sec  Ao max P.0 mmHg  Ao V2 mean: 92.1 cm/sec  Ao mean P.0 mmHg  Ao V2 VTI: 30.0 cm  TRISHA(I,D): 2.4 cm2  TRISHA(V,D): 2.1 cm2  LV V1 max PG: 3.0 mmHg  LV V1 max: 86.5 cm/sec  LV V1 VTI: 21.4 cm  SV(LVOT): 72.8 ml  SI(LVOT): 37.1 ml/m2  PA acc time: 0.11 sec  AV Ja Ratio (DI): 0.62  TRISHA Index (cm2/m2): 1.2  E/E' av.4  Lateral E/e': 6.1     Medial E/e': 8.6  RV S Ja: 11.6 cm/sec     ______________________________________________________________________________  Report approved by: Dangelo Young 2024 01:24 PM

## 2024-02-12 NOTE — LETTER
2/12/2024    [address] [address]    RE: Lorena Tolbert       Dear Colleague,     I had the pleasure of seeing Lorena Tolbert in the [facility].      Cardiology Clinic Progress Note:    February 12, 2024   Patient Name: Lorena Tolbert  Patient MRN: [id number]     Consult indication: MINOCA    HPI:    I had the opportunity to see patient Lorena Tolbert in cardiology clinic for a follow up visit. Patient is followed by our colleagues at [facility] with Primary Care.     Patient is a pleasant 53-year-old female who presents for follow-up regarding recent hospitalization for MINOCA.    Patient reports that on the morning of presentation 2/2024 she had been up all night dealing with an emotionally stressful situation.  She has custody of a special needs child with severe autism.  She developed chest pain with radiation to her arm.  No palpitations, URI symptoms, recent vaccines/boosters, recent nausea, vomiting, diarrhea.  Troponin was elevated.  She was seen in consultation by my colleague Dr. Obrien.  Patient was assessed with an echocardiogram that demonstrated normal cardiac function, LVEF 55 to 60%, normal wall motion, trace AI, ascending aorta was 4 cm.  Patient underwent coronary angiography 2/2/2024 that demonstrated angiographically normal coronary arteries except for a segment of mid LAD intramyocardial bridging with mild systolic compression.  She was discharged on aspirin and metoprolol succinate.    Overall patient reports that she feels quite well.  She was seen in the ED at an outside hospital for musculoskeletal pain, CT PE study 2/9/2024 was negative for PE, ascending aorta 3.8 cm.  Patient reports that she has not had recurrence of the chest pain that brought her in initially.  Denies symptoms of dyspnea, abnormal lower extremity swelling, orthopnea/PND.    Patient does report that her brother has hypertrophic cardiomyopathy.   Family history of aortic aneurysm.    Assessment and Plan/Recommendations:    # MINOCA, etiology unclear, could be related to stress cardiomyopathy. Denies any prodromal symptoms suggestive of myopericarditis.  Coronary vasospasm considered.  Coronary angiogram did not report any findings suggestive of SCAD.  # FH of HCM, no FH of SCD    - Reviewed prior cardiac history in detail  - Agree cardiac MRI would be beneficial  - Continue aspirin, metoprolol succinate.  If there is evidence of prior MI, coronary vasospasm would be considered, and we will plan to change metoprolol succinate to diltiazem.  - Recommend screening TTE every 3 to 5 years with PCP given family history of hypertrophic cardiomyopathy, and family history of dilated aorta  - Offered referral to Mental Health, patient declines  - Follow-up in cardiology clinic as needed    Thank you for allowing our team to participate in the care of Lorena Tolbert.  Please do not hesitate to call or page me with any questions or concerns.    Sincerely,     Jd Aldana MD, [facility]  Cardiology  Text Page   February 12, 2024    cc  Dinh Naylor,   [address] [place] [address]    Voice recognition software utilized.     Total time spent on this encounter today: Greater than 40 minutes, providing care in this encounter including, but not limited to, reviewing prior medical records, laboratory data, imaging studies, diagnostic studies, procedure notes, formulating an assessment and plan, recommendations, discussion and counseling with patient face to face, dictation.    Past Medical History:   MINOCA    Past Surgical History:   Past Surgical History:   Procedure Laterality Date    CV CORONARY ANGIOGRAM N/A 2/2/2024    Procedure: Coronary Angiogram;  Surgeon: Iliana Singleton MD;  Location: [facility] CARDIAC CATH LAB       Medications (outpatient):  Current Outpatient Medications   Medication Sig Dispense Refill    aspirin 81 MG EC tablet  "Take 1 tablet (81 mg) by mouth daily 30 tablet 0    Cyanocobalamin (VITAMIN B12 PO) Take 1 tablet by mouth daily      Ferrous Sulfate (IRON PO) Take 1 tablet by mouth daily      metoprolol succinate ER (TOPROL XL) 25 MG 24 hr tablet Take 1 tablet (25 mg) by mouth daily 90 tablet 3    nitroGLYcerin (NITROSTAT) 0.4 MG sublingual tablet For chest pain place 1 tablet under the tongue every 5 minutes for 3 doses. If symptoms persist 5 minutes after 1st dose call 911. 25 tablet 0    VITAMIN D PO Take 1 tablet by mouth daily         Allergies:  No Known Allergies    Social History:   History   Drug Use Unknown      History   Smoking Status    Former    Types: Cigarettes    Quit date: 2000   Smokeless Tobacco    Never     Social History    Substance and Sexual Activity      Alcohol use: Never       Family History:  History reviewed. No pertinent family history.    Review of Systems:   A complete review of systems was negative except as mentioned in the History of Present Illness.     Objective & Physical Exam:  /84 (BP Location: Right arm, Patient Position: Sitting, Cuff Size: Adult Regular)   Pulse 94   Ht 1.651 m (5' 5\")   Wt 87.4 kg (192 lb 11.2 oz)   SpO2 99%   BMI 32.07 kg/m    Wt Readings from Last 2 Encounters:   02/12/24 87.4 kg (192 lb 11.2 oz)   02/03/24 88 kg (193 lb 14.4 oz)     Body mass index is 32.07 kg/m .   Body surface area is 2 meters squared.    Constitutional: appears stated age, in no apparent distress, appears to be well nourished  Head: normocephalic, atraumatic  Neck: supple, trachea midline  Pulmonary: clear to auscultation bilaterally, no wheezes, no rales, no increased work of breathing  Cardiovascular: JVP normal, regular rate, regular rhythm, normal S1 and S2, no S3, S4, no murmur appreciated, no lower extremity edema  Gastrointestinal: no guarding, non-rigid   Neurologic: awake, alert, moves all extremities  Skin: no jaundice, warm on limited exam  Psychiatric: affect is normal, " answers questions appropriately, oriented to self and place    Data reviewed:  Lab Results   Component Value Date    WBC 5.7 02/02/2024    RBC 4.53 02/02/2024    HGB 13.1 02/02/2024    HCT 40.3 02/02/2024    MCV 89 02/02/2024    MCH 28.9 02/02/2024    MCHC 32.5 02/02/2024    RDW 15.8 (H) 02/02/2024     02/02/2024     Sodium   Date Value Ref Range Status   02/02/2024 138 135 - 145 mmol/L Final     Comment:     Reference intervals for this test were updated on 09/26/2023 to more accurately reflect our healthy population. There may be differences in the flagging of prior results with similar values performed with this method. Interpretation of those prior results can be made in the context of the updated reference intervals.      Potassium   Date Value Ref Range Status   02/03/2024 4.0 3.4 - 5.3 mmol/L Final     Chloride   Date Value Ref Range Status   02/02/2024 105 98 - 107 mmol/L Final     Carbon Dioxide (CO2)   Date Value Ref Range Status   02/02/2024 26 22 - 29 mmol/L Final     Anion Gap   Date Value Ref Range Status   02/02/2024 7 7 - 15 mmol/L Final     GLUCOSE BY METER POCT   Date Value Ref Range Status   02/03/2024 90 70 - 99 mg/dL Final     Urea Nitrogen   Date Value Ref Range Status   02/02/2024 14.2 6.0 - 20.0 mg/dL Final     Creatinine   Date Value Ref Range Status   02/02/2024 0.73 0.51 - 0.95 mg/dL Final     GFR Estimate   Date Value Ref Range Status   02/02/2024 >90 >60 mL/min/1.73m2 Final     Calcium   Date Value Ref Range Status   02/02/2024 9.3 8.6 - 10.0 mg/dL Final     Bilirubin Total   Date Value Ref Range Status   02/02/2024 0.4 <=1.2 mg/dL Final     Alkaline Phosphatase   Date Value Ref Range Status   02/02/2024 41 40 - 150 U/L Final     Comment:     Reference intervals for this test were updated on 11/14/2023 to more accurately reflect our healthy population. There may be differences in the flagging of prior results with similar values performed with this method. Interpretation of those  prior results can be made in the context of the updated reference intervals.     ALT   Date Value Ref Range Status   2024 13 0 - 50 U/L Final     Comment:     Reference intervals for this test were updated on 2023 to more accurately reflect our healthy population. There may be differences in the flagging of prior results with similar values performed with this method. Interpretation of those prior results can be made in the context of the updated reference intervals.       AST   Date Value Ref Range Status   2024 27 0 - 45 U/L Final     Comment:     Reference intervals for this test were updated on 2023 to more accurately reflect our healthy population. There may be differences in the flagging of prior results with similar values performed with this method. Interpretation of those prior results can be made in the context of the updated reference intervals.     Recent Labs   Lab Test 24  0538   CHOL 168  163   HDL 71  72   LDL 81  78   TRIG 79  65      Lab Results   Component Value Date    A1C 5.3 2024        Recent Results (from the past 4320 hour(s))   Echocardiogram Complete   Result Value    LVEF  55-60%    Narrative    [id number]  [id number]  [id number]  [id number]^ALBERT^HALIMA^MEMO     [facility]  Echocardiography Laboratory  [address] [place] [address]     Name: SARA CABRAL  MRN: [id number]  : 1970  Study Date: 2024 09:54 AM  Age: 53 yrs  Gender: Female  Patient Location: [facility]  Reason For Study: Chest Pain  Ordering Physician: HALIMA PRICE  Referring Physician: SYSTEM, PROVIDER NOT IN  Performed By: Melania Spicer     BSA: 2.0 m2  Height: 65 in  Weight: 196 lb  HR: 64  BP: 154/84 mmHg  ______________________________________________________________________________  Procedure  Complete Echo Adult. Optison (NDC #[id number]) given  intravenously.  ______________________________________________________________________________  Interpretation Summary     The visual ejection fraction is 55-60%.  No regional wall motion abnormalities noted.  Normal tricuspid aortic valve  There is trace aortic regurgitation.  Aortic root dilatation is present.  Ascending aorta dilatation is present.  ______________________________________________________________________________  Left Ventricle  The left ventricle is normal in size. There is normal left ventricular wall  thickness. Diastolic Doppler findings (E/E' ratio and/or other parameters)  suggest left ventricular filling pressures are indeterminate. The visual  ejection fraction is 55-60%. No regional wall motion abnormalities noted.     Right Ventricle  The right ventricle is normal in size and function.     Atria  Normal left atrial size. Right atrial size is normal. There is no color  Doppler evidence of an atrial shunt.     Mitral Valve  The mitral valve leaflets are mildly thickened. There is trace mitral  regurgitation.     Tricuspid Valve  There is trace tricuspid regurgitation. IVC diameter and respiratory changes  fall into an intermediate range suggesting an RA pressure of 8 mmHg.     Aortic Valve  Normal tricuspid aortic valve. There is trace aortic regurgitation.     Pulmonic Valve  There is trace pulmonic valvular regurgitation.     Vessels  Aortic root dilatation is present. Ascending aorta dilatation is present.     Pericardium  There is no pericardial effusion.     Rhythm  Sinus rhythm was noted.  ______________________________________________________________________________  MMode/2D Measurements & Calculations  IVSd: 0.99 cm     LVIDd: 3.9 cm  LVPWd: 0.98 cm  LV mass(C)d: 117.2 grams  LV mass(C)dI: 59.8 grams/m2  Ao root diam: 3.8 cm  LA dimension: 2.2 cm  asc Aorta Diam: 4.0 cm  LA/Ao: 0.58  LVOT diam: 2.1 cm  LVOT area: 3.4 cm2  Ao root diam index Ht(cm/m): 2.3  Ao root diam index BSA  (cm/m2): 1.9  Asc Ao diam index BSA (cm/m2): 2.1  Asc Ao diam index Ht(cm/m): 2.4  LA Volume (BP): 27.9 ml     LA Volume Index (BP): 14.2 ml/m2  RV Base: 2.7 cm  RWT: 0.51  TAPSE: 2.4 cm     Doppler Measurements & Calculations  MV E max ja: 62.6 cm/sec  MV A max ja: 102.4 cm/sec  MV E/A: 0.61  MV dec slope: 269.2 cm/sec2  MV dec time: 0.23 sec  Ao V2 max: 139.5 cm/sec  Ao max P.0 mmHg  Ao V2 mean: 92.1 cm/sec  Ao mean P.0 mmHg  Ao V2 VTI: 30.0 cm  TRISHA(I,D): 2.4 cm2  TRISHA(V,D): 2.1 cm2  LV V1 max PG: 3.0 mmHg  LV V1 max: 86.5 cm/sec  LV V1 VTI: 21.4 cm  SV(LVOT): 72.8 ml  SI(LVOT): 37.1 ml/m2  PA acc time: 0.11 sec  AV Ja Ratio (DI): 0.62  TRISHA Index (cm2/m2): 1.2  E/E' av.4  Lateral E/e': 6.1     Medial E/e': 8.6  RV S Ja: 11.6 cm/sec     ______________________________________________________________________________  Report approved by: Dangelo Young 2024 01:24 PM              Thank you for allowing me to participate in the care of your patient.      Sincerely,     Jd Aldana MD     [facility]  cc:   Dinh Naylor, DO  [address] [place] [address]

## 2024-02-12 NOTE — PATIENT INSTRUCTIONS
February 12, 2024    Thank you for allowing our Cardiology team to participate in your care.     Please note the following changes to your heart treatment plan:     Medication changes:   - none    Tests to be done:  - cardiac MRI    Follow up:  - Follow up as needed      For scheduling, please call [phone].      Please contact our team through [employer] or our Nurse Team Voicemail service [phone], or the General Clinic [phone] for any questions or concerns.     If you are having a medical emergency, please call [phone].     Sincerely,    Jd Aldana MD, [facility]  Cardiology    [facility] [place]

## 2024-03-01 ENCOUNTER — HOSPITAL ENCOUNTER (OUTPATIENT)
Dept: CARDIOLOGY | Facility: CLINIC | Age: 54
Discharge: HOME | End: 2024-03-01
Attending: INTERNAL MEDICINE | Admitting: INTERNAL MEDICINE
Payer: COMMERCIAL

## 2024-03-01 VITALS — DIASTOLIC BLOOD PRESSURE: 96 MMHG | SYSTOLIC BLOOD PRESSURE: 142 MMHG | HEART RATE: 80 BPM

## 2024-03-01 DIAGNOSIS — R79.89 ELEVATED TROPONIN LEVEL NOT DUE TO ACUTE CORONARY SYNDROME: ICD-10-CM

## 2024-03-01 PROCEDURE — 75561 CARDIAC MRI FOR MORPH W/DYE: CPT | Mod: 26 | Performed by: INTERNAL MEDICINE

## 2024-03-01 PROCEDURE — 255N000002 HC RX 255 OP 636: Performed by: INTERNAL MEDICINE

## 2024-03-01 PROCEDURE — 250N000013 HC RX MED GY IP 250 OP 250 PS 637: Performed by: INTERNAL MEDICINE

## 2024-03-01 PROCEDURE — 75561 CARDIAC MRI FOR MORPH W/DYE: CPT

## 2024-03-01 PROCEDURE — A9585 GADOBUTROL INJECTION: HCPCS | Performed by: INTERNAL MEDICINE

## 2024-03-01 RX ORDER — DIPHENHYDRAMINE HCL 25 MG
25 CAPSULE ORAL
Status: DISCONTINUED | OUTPATIENT
Start: 2024-03-01 | End: 2024-03-02 | Stop reason: HOSPADM

## 2024-03-01 RX ORDER — DIAZEPAM 5 MG
5 TABLET ORAL EVERY 30 MIN PRN
Status: DISCONTINUED | OUTPATIENT
Start: 2024-03-01 | End: 2024-03-02 | Stop reason: HOSPADM

## 2024-03-01 RX ORDER — DIPHENHYDRAMINE HYDROCHLORIDE 50 MG/ML
25-50 INJECTION INTRAMUSCULAR; INTRAVENOUS
Status: DISCONTINUED | OUTPATIENT
Start: 2024-03-01 | End: 2024-03-02 | Stop reason: HOSPADM

## 2024-03-01 RX ORDER — ONDANSETRON 2 MG/ML
4 INJECTION INTRAMUSCULAR; INTRAVENOUS
Status: DISCONTINUED | OUTPATIENT
Start: 2024-03-01 | End: 2024-03-02 | Stop reason: HOSPADM

## 2024-03-01 RX ORDER — ACYCLOVIR 200 MG/1
0-1 CAPSULE ORAL
Status: DISCONTINUED | OUTPATIENT
Start: 2024-03-01 | End: 2024-03-02 | Stop reason: HOSPADM

## 2024-03-01 RX ORDER — GADOBUTROL 604.72 MG/ML
11 INJECTION INTRAVENOUS ONCE
Status: COMPLETED | OUTPATIENT
Start: 2024-03-01 | End: 2024-03-01

## 2024-03-01 RX ORDER — METHYLPREDNISOLONE SODIUM SUCCINATE 125 MG/2ML
125 INJECTION, POWDER, LYOPHILIZED, FOR SOLUTION INTRAMUSCULAR; INTRAVENOUS
Status: DISCONTINUED | OUTPATIENT
Start: 2024-03-01 | End: 2024-03-02 | Stop reason: HOSPADM

## 2024-03-01 RX ADMIN — GADOBUTROL 11 ML: 604.72 INJECTION INTRAVENOUS at 16:49

## 2024-03-01 RX ADMIN — DIAZEPAM 5 MG: 5 TABLET ORAL at 14:43

## 2024-03-05 ENCOUNTER — TELEPHONE (OUTPATIENT)
Dept: CARDIOLOGY | Facility: CLINIC | Age: 54
End: 2024-03-05
Payer: COMMERCIAL

## 2024-03-05 DIAGNOSIS — I21.4 NSTEMI (NON-ST ELEVATED MYOCARDIAL INFARCTION) (H): Primary | ICD-10-CM

## 2024-03-05 RX ORDER — AMLODIPINE BESYLATE 5 MG/1
5 TABLET ORAL DAILY
Qty: 30 TABLET | Refills: 2 | Status: SHIPPED | OUTPATIENT
Start: 2024-03-05 | End: 2024-06-12

## 2024-03-05 NOTE — TELEPHONE ENCOUNTER
Called patient and left VM to notify her that her metoprolol succinate has been sent 2/12/24 for 90 tabs 3 refills and to check with the pharmacy for this order. Directions left to call us back with further questions.    *Also of note that Dr. Aldana is planning to change this medication to amlodipine*

## 2024-03-05 NOTE — TELEPHONE ENCOUNTER
Message from Dr. Aldana re: MRI  Jd Aldana MD [facility] Heart Team 2  Results reviewed, please let the patient know that overall findings are reassuring, normal cardiac function, however there are findings suggesting prior scar in the lateral wall segment, which could be from vasospasm, and so I recommend changing metoprolol succinate to amlodipine 5mg daily, she should monitor BPs at home and if BPs are consistently >130/80mmHg, let us know and plan will be to increase this to 10mg daily, if having issues with ankle swelling then will change to diltiazem    0910 attempted to contact the patient to review MRI results. Left a message that Dr. Aldana is recommending a medication adjustment and asked patient to call back to discuss.  Left message for patient to call back to Team 2 R.N.s @ [phone]

## 2024-03-05 NOTE — TELEPHONE ENCOUNTER
[facility]    Phone Message    May a detailed message be left on voicemail: yes     Reason for Call: Requesting Results     Name/type of test: MRI cardiac w/ contrast  Date of test: 03/01/2024  Was test done at a location other than [facility] (Please fill in the location if not [facility])?: Yes: [place]    Patient called wanting to know if results are available and if someone from care team will go over it with them. Please call patient back to further discuss.    Action Taken: Other: Cardiology    Travel Screening: Not Applicable    Thank you!  [facility]

## 2024-03-05 NOTE — TELEPHONE ENCOUNTER
Spoke with Patient and test results reviewed.  Patient agrees with stopping metoprolol succinate and changing to Amlodipine 5 mg daily  Patient will monitor BP, HR, and edema and call with an update in 1 - 2 weeks.  Will call sooner if BP consistently >130/80 mmHG.   Plan would be to increase Amlodipine.

## 2024-03-05 NOTE — RESULT ENCOUNTER NOTE
Results reviewed, please let the patient know that overall findings are reassuring, normal cardiac function, however there are findings suggesting prior scar in the lateral wall segment, which could be from vasospasm, and so I recommend changing metoprolol succinate to amlodipine 5mg daily, she should monitor BPs at home and if BPs are consistently >130/80mmHg, let us know and plan will be to increase this to 10mg daily, if having issues with ankle swelling then will change to diltiazem thanks

## 2024-03-05 NOTE — TELEPHONE ENCOUNTER
[facility] Center    Phone Message    May a detailed message be left on voicemail: yes     Reason for Call: Medication Refill Request    Has the patient contacted the pharmacy for the refill? Yes   Name of medication being requested: metoprolol succinate ER (TOPROL XL) 25 MG 24 hr tablet   Provider who prescribed the medication: Dr. Jd Aldana  Pharmacy: [id number] - [place], [place] - [address]     Date medication is needed: ASAP   Patient called requesting for refill orders to be sent to pharmacy as they are completely out. Please review and send orders as needed. Thank you!    Action Taken: Other: Cardiology    Travel Screening: Not Applicable    Thank you!  [facility]

## 2024-06-10 ENCOUNTER — TELEPHONE (OUTPATIENT)
Dept: CARDIOLOGY | Facility: CLINIC | Age: 54
End: 2024-06-10
Payer: COMMERCIAL

## 2024-06-10 NOTE — TELEPHONE ENCOUNTER
Received a refill request from [facility] for the amlodipine 5mg daily.  Attempted to contact patient for an update re: is she tolerating the medication? Are there any side effects? What is BP? What quantity does she prefer?    Left message for patient to call back to Team 2 RJuancarlosN.s @ [phone]   May need an order for annual visit also

## 2024-06-12 ENCOUNTER — TELEPHONE (OUTPATIENT)
Dept: CARDIOLOGY | Facility: CLINIC | Age: 54
End: 2024-06-12
Payer: COMMERCIAL

## 2024-06-12 DIAGNOSIS — I21.4 NSTEMI (NON-ST ELEVATED MYOCARDIAL INFARCTION) (H): ICD-10-CM

## 2024-06-12 RX ORDER — AMLODIPINE BESYLATE 5 MG/1
5 TABLET ORAL DAILY
Qty: 30 TABLET | Refills: 5 | Status: SHIPPED | OUTPATIENT
Start: 2024-06-12 | End: 2024-06-17

## 2024-06-12 NOTE — TELEPHONE ENCOUNTER
[facility] Call Center    Phone Message    May a detailed message be left on voicemail: yes     Reason for Call: Medication Refill Request    Has the patient contacted the pharmacy for the refill? Yes   Name of medication being requested: amLODIPine (NORVASC) 5 MG tablet [[id number]] (Order [id number]   Provider who prescribed the medication: jus bennett   Pharmacy: [id number] [place] - [address]     Date medication is needed: pt submitted online via [email or web address] for refill but when she went to pick it up they told her she needs MD to reapprove/send new order for refills to pharmacy prior to filling. Please send refill order to pharmacy for the above medication.        Action Taken: Other: cardiology     Travel Screening: Not Applicable       Thank you!  [facility]

## 2024-06-13 NOTE — TELEPHONE ENCOUNTER
Spoke to patient, says she is not checking her BP regularly. She notes when she first started the medication she got a few low readings, but it has leveled out over time. She is not aware of any side effects. She will check her BP periodically over the next few weeks and will call back if readings consistently >130/80.

## 2024-06-17 DIAGNOSIS — R79.89 ELEVATED TROPONIN LEVEL NOT DUE TO ACUTE CORONARY SYNDROME: Primary | ICD-10-CM

## 2024-06-17 DIAGNOSIS — I21.4 NSTEMI (NON-ST ELEVATED MYOCARDIAL INFARCTION) (H): ICD-10-CM

## 2024-06-17 RX ORDER — AMLODIPINE BESYLATE 5 MG/1
5 TABLET ORAL DAILY
Qty: 90 TABLET | Refills: 3 | Status: SHIPPED | OUTPATIENT
Start: 2024-06-17

## 2024-06-17 NOTE — TELEPHONE ENCOUNTER
Call from patient. Her amlodipine script sent last week seems to be lost at [facility]. Resent the script and she will call if there are any further concerns.

## 2025-04-08 ENCOUNTER — TELEPHONE (OUTPATIENT)
Dept: CARDIOLOGY | Facility: CLINIC | Age: 55
End: 2025-04-08
Payer: COMMERCIAL

## 2025-04-08 NOTE — TELEPHONE ENCOUNTER
[facility] Call Center    Phone Message    May a detailed message be left on voicemail: yes     Reason for Call: Medication Refill Request    Has the patient contacted the pharmacy for the refill? Yes   Name of medication being requested: amLODIPine (NORVASC) 5 MG tablet   Provider who prescribed the medication: Jovan  Pharmacy: [facility] #[id number] - [place], [place] - [address] [address]     Date medication is needed: 04/08/2025     Action Taken: Other: Cardiology    Travel Screening: Not Applicable     Date of Service:

## 2025-04-08 NOTE — CONFIDENTIAL NOTE
Called patient and left a voicemail notifying she should still have enough refill through June of 2025 and that she is now following up with Cardiology as-needed per Dr. Aldana's last visit note. She should request refills from her PCP going forward and to call us back with further questions.

## (undated) DEVICE — DEFIB PRO-PADZ LVP LQD GEL ADULT 8900-2105-01

## (undated) DEVICE — TOTE ANGIO CORP PC15AT SAN32CC83O

## (undated) DEVICE — KIT HAND CONTROL ANGIOTOUCH ACIST 65CM AT-P65

## (undated) DEVICE — RAD G/W INQWIRE .035X260CM J-TIP EXCHANGE IQ35F260J1O5RS

## (undated) DEVICE — CATH ANGIO INFINITI JR4 4FRX100CM 538421

## (undated) DEVICE — SLEEVE TR BAND RADIAL COMPRESSION DEVICE 24CM TRB24-REG

## (undated) DEVICE — MANIFOLD KIT ANGIO AUTOMATED 014613

## (undated) DEVICE — CATH ANGIO INFINITI JL3.5 4FRX100CM 538418

## (undated) DEVICE — INTRO GLIDESHEATH SLENDER 6FR 10X45CM 60-1060

## (undated) RX ORDER — VERAPAMIL HYDROCHLORIDE 2.5 MG/ML
INJECTION, SOLUTION INTRAVENOUS
Status: DISPENSED
Start: 2024-02-02

## (undated) RX ORDER — NITROGLYCERIN 5 MG/ML
VIAL (ML) INTRAVENOUS
Status: DISPENSED
Start: 2024-02-02

## (undated) RX ORDER — FENTANYL CITRATE 50 UG/ML
INJECTION, SOLUTION INTRAMUSCULAR; INTRAVENOUS
Status: DISPENSED
Start: 2024-02-02

## (undated) RX ORDER — HEPARIN SODIUM 1000 [USP'U]/ML
INJECTION, SOLUTION INTRAVENOUS; SUBCUTANEOUS
Status: DISPENSED
Start: 2024-02-02

## (undated) RX ORDER — HEPARIN SODIUM 200 [USP'U]/100ML
INJECTION, SOLUTION INTRAVENOUS
Status: DISPENSED
Start: 2024-02-02

## (undated) RX ORDER — LIDOCAINE HYDROCHLORIDE 10 MG/ML
INJECTION, SOLUTION EPIDURAL; INFILTRATION; INTRACAUDAL; PERINEURAL
Status: DISPENSED
Start: 2024-02-02

## (undated) RX ORDER — DIAZEPAM 5 MG
TABLET ORAL
Status: DISPENSED
Start: 2024-03-01